# Patient Record
Sex: FEMALE | Race: WHITE | Employment: STUDENT | URBAN - METROPOLITAN AREA
[De-identification: names, ages, dates, MRNs, and addresses within clinical notes are randomized per-mention and may not be internally consistent; named-entity substitution may affect disease eponyms.]

---

## 2017-01-03 ENCOUNTER — OFFICE VISIT (OUTPATIENT)
Dept: FAMILY MEDICINE CLINIC | Facility: CLINIC | Age: 22
End: 2017-01-03

## 2017-01-03 VITALS
TEMPERATURE: 98 F | SYSTOLIC BLOOD PRESSURE: 110 MMHG | BODY MASS INDEX: 35.08 KG/M2 | RESPIRATION RATE: 20 BRPM | DIASTOLIC BLOOD PRESSURE: 72 MMHG | HEART RATE: 88 BPM | HEIGHT: 63 IN | WEIGHT: 198 LBS

## 2017-01-03 DIAGNOSIS — J01.90 ACUTE SINUSITIS, UNSPECIFIED: ICD-10-CM

## 2017-01-03 DIAGNOSIS — R10.9 ABDOMINAL DISCOMFORT: ICD-10-CM

## 2017-01-03 PROCEDURE — 99212 OFFICE O/P EST SF 10 MIN: CPT | Performed by: FAMILY MEDICINE

## 2017-01-03 PROCEDURE — 99213 OFFICE O/P EST LOW 20 MIN: CPT | Performed by: FAMILY MEDICINE

## 2017-01-03 RX ORDER — AMOXICILLIN 875 MG/1
875 TABLET, COATED ORAL 2 TIMES DAILY
Qty: 20 TABLET | Refills: 0 | Status: SHIPPED | OUTPATIENT
Start: 2017-01-03 | End: 2017-01-17

## 2017-01-03 NOTE — PROGRESS NOTES
HPI:    Patient ID: Patricia Booth is a 25year old female. HPI Comments: Pt presents for follow up from the immediate care for epigastric pains for a couple days. No fevers, diarrhea or vomiting. Patient was advised to take an antacid.  Patient states sym tenderness. There is no rebound and no guarding. Lymphadenopathy:     She has no cervical adenopathy.               ASSESSMENT/PLAN:   Abdominal discomfort:  - After discussion, will send to GI for further evaluation and treatment if recurrent or persiste

## 2017-01-16 ENCOUNTER — APPOINTMENT (OUTPATIENT)
Dept: LAB | Age: 22
End: 2017-01-16
Attending: OBSTETRICS & GYNECOLOGY
Payer: COMMERCIAL

## 2017-01-16 DIAGNOSIS — Z30.09 BIRTH CONTROL COUNSELING: ICD-10-CM

## 2017-01-16 LAB — HCG SERPL QL: NEGATIVE

## 2017-01-16 PROCEDURE — 84703 CHORIONIC GONADOTROPIN ASSAY: CPT

## 2017-01-16 PROCEDURE — 36415 COLL VENOUS BLD VENIPUNCTURE: CPT

## 2017-01-17 ENCOUNTER — OFFICE VISIT (OUTPATIENT)
Dept: OBGYN CLINIC | Facility: CLINIC | Age: 22
End: 2017-01-17

## 2017-01-17 VITALS
BODY MASS INDEX: 35 KG/M2 | WEIGHT: 196 LBS | SYSTOLIC BLOOD PRESSURE: 134 MMHG | DIASTOLIC BLOOD PRESSURE: 83 MMHG | HEART RATE: 96 BPM

## 2017-01-17 DIAGNOSIS — Z11.3 SCREEN FOR STD (SEXUALLY TRANSMITTED DISEASE): Primary | ICD-10-CM

## 2017-01-17 DIAGNOSIS — Z30.430 ENCOUNTER FOR INSERTION OF INTRAUTERINE CONTRACEPTIVE DEVICE: ICD-10-CM

## 2017-01-17 PROCEDURE — 58300 INSERT INTRAUTERINE DEVICE: CPT | Performed by: OBSTETRICS & GYNECOLOGY

## 2017-01-17 NOTE — PROCEDURES
IUD Insertion     Pregnancy Results: negative from blood test   Consent signed. Procedure discussed with the patient in detail including indication, risks, benefits, alternatives and complications.     Pelvic Exam Findings:  Lesion description: 8 week ante

## 2017-01-18 LAB
C TRACH DNA SPEC QL NAA+PROBE: NEGATIVE
N GONORRHOEA DNA SPEC QL NAA+PROBE: NEGATIVE

## 2017-03-03 ENCOUNTER — OFFICE VISIT (OUTPATIENT)
Dept: OBGYN CLINIC | Facility: CLINIC | Age: 22
End: 2017-03-03

## 2017-03-03 VITALS — SYSTOLIC BLOOD PRESSURE: 119 MMHG | HEART RATE: 90 BPM | DIASTOLIC BLOOD PRESSURE: 80 MMHG

## 2017-03-03 DIAGNOSIS — Z30.431 IUD CHECK UP: Primary | ICD-10-CM

## 2017-03-03 PROCEDURE — 99213 OFFICE O/P EST LOW 20 MIN: CPT | Performed by: OBSTETRICS & GYNECOLOGY

## 2017-03-05 NOTE — PROGRESS NOTES
Domenica Kaur is a 23year old female  Patient's last menstrual period was 2016. Patient presents with:  Gyn Problem: IUD check  Pt presents for IUD string check. She had Mirena IUD placed on 17. She denies any vaginal bleeding.  She is hap mobility, without tenderness  Adnexa: normal without masses or tenderness  Perineum: normal      Assessment & Plan:  Gregory Hastings was seen today for gyn problem. Diagnoses and all orders for this visit:    IUD check up      Plan:   1.  IUD in place by strings a

## 2017-04-13 ENCOUNTER — TELEPHONE (OUTPATIENT)
Dept: OBGYN CLINIC | Facility: CLINIC | Age: 22
End: 2017-04-13

## 2017-04-13 NOTE — TELEPHONE ENCOUNTER
This can be from the [address] IUD. It usually gets better with time. Recommend acne face washes and for her to see Dermatology if it is bothering her.

## 2017-04-13 NOTE — TELEPHONE ENCOUNTER
Had iud placed last month now has some acne out of no where not sure if from iud, no other symptoms, except some cramping in back area.

## 2017-04-13 NOTE — TELEPHONE ENCOUNTER
Pt calling because she is getting acne the last few days and wants to know if it is from the IUD. Denies any acne before the last few days. Had the IUD, Mirena, inserted 1/2017. Pt states that she does not get a period, but has cramping intermittently.

## 2017-06-13 ENCOUNTER — OFFICE VISIT (OUTPATIENT)
Dept: FAMILY MEDICINE CLINIC | Facility: CLINIC | Age: 22
End: 2017-06-13

## 2017-06-13 VITALS
SYSTOLIC BLOOD PRESSURE: 113 MMHG | DIASTOLIC BLOOD PRESSURE: 73 MMHG | BODY MASS INDEX: 35 KG/M2 | WEIGHT: 198 LBS | HEART RATE: 81 BPM

## 2017-06-13 DIAGNOSIS — M25.562 ACUTE PAIN OF LEFT KNEE: Primary | ICD-10-CM

## 2017-06-13 PROCEDURE — 99212 OFFICE O/P EST SF 10 MIN: CPT | Performed by: PHYSICIAN ASSISTANT

## 2017-06-13 PROCEDURE — 99213 OFFICE O/P EST LOW 20 MIN: CPT | Performed by: PHYSICIAN ASSISTANT

## 2017-06-13 NOTE — PROGRESS NOTES
HPI:    Patient ID: Gloria Calhoun is a 23year old female. HPI Comments: Patient presents for pain in left knee since yesterday. Patient states was walking at work and felt acute pain in left knee. She denies any twisting injury or fall.   She works in encounter.        Meds This Visit:  No prescriptions requested or ordered in this encounter    Imaging & Referrals:  None       #[id number]

## 2017-09-21 ENCOUNTER — NURSE TRIAGE (OUTPATIENT)
Dept: OTHER | Age: 22
End: 2017-09-21

## 2017-09-21 RX ORDER — AMOXICILLIN AND CLAVULANATE POTASSIUM 875; 125 MG/1; MG/1
1 TABLET, FILM COATED ORAL 2 TIMES DAILY
Qty: 20 TABLET | Refills: 0 | Status: SHIPPED | OUTPATIENT
Start: 2017-09-21 | End: 2018-01-03 | Stop reason: ALTCHOICE

## 2017-09-21 NOTE — TELEPHONE ENCOUNTER
Message noted. May start augmentin as requested. Erx sent to listed pharmacy.  To follow up for appointment if not better; Please notify patient

## 2017-09-21 NOTE — TELEPHONE ENCOUNTER
Action Requested: Summary for Provider     []  Critical Lab, Recommendations Needed  [] Need Additional Advice  []   FYI    []   Need Orders  [x] Need Medications Sent to Pharmacy  []  Other     SUMMARY: Pt requesting ABX-has chronic sinus infection last t

## 2018-01-03 ENCOUNTER — OFFICE VISIT (OUTPATIENT)
Dept: FAMILY MEDICINE CLINIC | Facility: CLINIC | Age: 23
End: 2018-01-03

## 2018-01-03 VITALS
TEMPERATURE: 100 F | SYSTOLIC BLOOD PRESSURE: 118 MMHG | DIASTOLIC BLOOD PRESSURE: 72 MMHG | RESPIRATION RATE: 16 BRPM | HEART RATE: 82 BPM | OXYGEN SATURATION: 98 %

## 2018-01-03 DIAGNOSIS — J06.9 VIRAL URI: Primary | ICD-10-CM

## 2018-01-03 PROCEDURE — 99213 OFFICE O/P EST LOW 20 MIN: CPT | Performed by: NURSE PRACTITIONER

## 2018-01-03 NOTE — PATIENT INSTRUCTIONS
Try afrin nasal spray for 3 days for nasal congestion. Viral Upper Respiratory Illness (Adult)  You have a viral upper respiratory illness (URI), which is another term for the common cold. This illness is contagious during the first few days.  It is sp When to seek medical advice  Call your healthcare provider right away if any of these occur:  · Cough with lots of colored sputum (mucus)  · Severe headache; face, neck, or ear pain  · Difficulty swallowing due to throat pain  · Fever of 100.4°F (38°C) or

## 2018-01-03 NOTE — PROGRESS NOTES
CHIEF COMPLAINT:   Patient presents with:  URI      HPI:   Veronica Perez is a 23year old female who presents for upper respiratory symptoms for 2-3 days. No fevers. Hx of sinus infections. OTC: sudafed. No current outpatient prescriptions on file.    No Viral uri  (primary encounter diagnosis)    PLAN: Comfort care as described in Patient Instructions. Work note provided. Patient Instructions     Try afrin nasal spray for 3 days for nasal congestion.     Viral Upper Respiratory Illness (Adult)  You ha · Over-the-counter cold medicines will not shorten the length of time you’re sick, but they may be helpful for the following symptoms: cough, sore throat, and nasal and sinus congestion.  (Note: Do not use decongestants if you have high blood pressure.)  Fo

## 2018-01-05 ENCOUNTER — TELEPHONE (OUTPATIENT)
Dept: FAMILY MEDICINE CLINIC | Facility: CLINIC | Age: 23
End: 2018-01-05

## 2018-01-05 ENCOUNTER — TELEPHONE (OUTPATIENT)
Dept: OTHER | Age: 23
End: 2018-01-05

## 2018-01-05 NOTE — TELEPHONE ENCOUNTER
Patient was seen in the UC on 1/3/17 and continues to have a sore throat. Her work is requiring her to have a return to work note. She tried to call the UC she went to and they are not answering her message. Appointment made for tomorrow 1/6/17.

## 2018-01-05 NOTE — TELEPHONE ENCOUNTER
Returned call from pt: states employer is requesting a specific date she can return to work. Called off work today due to sore throat and excessive coughing, and is not scheduled to work until 1/9/18. Note provided stating she can return to work then.  Disc

## 2018-02-05 ENCOUNTER — OFFICE VISIT (OUTPATIENT)
Dept: OBGYN CLINIC | Facility: CLINIC | Age: 23
End: 2018-02-05

## 2018-02-05 VITALS
HEIGHT: 63 IN | WEIGHT: 200 LBS | SYSTOLIC BLOOD PRESSURE: 114 MMHG | HEART RATE: 80 BPM | DIASTOLIC BLOOD PRESSURE: 73 MMHG | BODY MASS INDEX: 35.44 KG/M2

## 2018-02-05 DIAGNOSIS — Z01.419 WELL WOMAN EXAM WITH ROUTINE GYNECOLOGICAL EXAM: Primary | ICD-10-CM

## 2018-02-05 DIAGNOSIS — Z12.4 SCREENING FOR MALIGNANT NEOPLASM OF CERVIX: ICD-10-CM

## 2018-02-05 DIAGNOSIS — Z11.3 SCREENING FOR STD (SEXUALLY TRANSMITTED DISEASE): ICD-10-CM

## 2018-02-05 PROCEDURE — 99395 PREV VISIT EST AGE 18-39: CPT | Performed by: CLINICAL NURSE SPECIALIST

## 2018-02-05 RX ORDER — ACETAMINOPHEN AND CODEINE PHOSPHATE 300; 30 MG/1; MG/1
TABLET ORAL
COMMUNITY
Start: 2017-11-11 | End: 2018-05-08 | Stop reason: ALTCHOICE

## 2018-02-05 RX ORDER — CLINDAMYCIN HYDROCHLORIDE 150 MG/1
CAPSULE ORAL
COMMUNITY
Start: 2017-11-11 | End: 2018-05-08 | Stop reason: ALTCHOICE

## 2018-02-05 RX ORDER — IBUPROFEN 600 MG/1
TABLET ORAL
COMMUNITY
Start: 2017-11-14 | End: 2018-05-08 | Stop reason: ALTCHOICE

## 2018-02-05 NOTE — PROGRESS NOTES
Kimberly Montana is a 26year old female  No LMP recorded. Patient is not currently having periods (Reason: IUD - Intrauterine Device). Patient presents with:  Gyn Exam: annual exam, cramping and bleeding has IUD  Last annual exam was 2016.  No pap hx Age of Onset   • Diabetes Mother    • Heart Disorder Mother    • Cancer Other      lung cancer-grandfather       MEDICATIONS:    Current Outpatient Prescriptions:   •  Acetaminophen-Codeine #3 300-30 MG Oral Tab, , Disp: , Rfl:   •  Clindamycin HCl 150 MG without tenderness on motion.  IUD strings visualized  Uterus: normal in size, contour, position, mobility, without tenderness  Adnexa: normal without masses or tenderness  Perineum: normal  Anus: no hemorroids     Assessment & Plan:  Leland Hurtado was seen today f

## 2018-02-06 LAB
C TRACH DNA SPEC QL NAA+PROBE: NEGATIVE
N GONORRHOEA DNA SPEC QL NAA+PROBE: NEGATIVE

## 2018-02-07 LAB — T VAGINALIS RRNA SPEC QL NAA+PROBE: NEGATIVE

## 2018-05-08 ENCOUNTER — HOSPITAL ENCOUNTER (OUTPATIENT)
Age: 23
Discharge: HOME | End: 2018-05-08
Payer: COMMERCIAL

## 2018-05-08 VITALS
DIASTOLIC BLOOD PRESSURE: 74 MMHG | RESPIRATION RATE: 18 BRPM | WEIGHT: 198 LBS | TEMPERATURE: 99 F | SYSTOLIC BLOOD PRESSURE: 125 MMHG | BODY MASS INDEX: 35.08 KG/M2 | HEART RATE: 84 BPM | HEIGHT: 63 IN | OXYGEN SATURATION: 99 %

## 2018-05-08 DIAGNOSIS — J06.9 UPPER RESPIRATORY VIRUS: Primary | ICD-10-CM

## 2018-05-08 PROCEDURE — 99214 OFFICE O/P EST MOD 30 MIN: CPT

## 2018-05-08 PROCEDURE — 99213 OFFICE O/P EST LOW 20 MIN: CPT

## 2018-05-08 PROCEDURE — 87430 STREP A AG IA: CPT

## 2018-05-08 RX ORDER — ALBUTEROL SULFATE 90 UG/1
2 AEROSOL, METERED RESPIRATORY (INHALATION) EVERY 4 HOURS PRN
Qty: 1 INHALER | Refills: 0 | Status: SHIPPED | OUTPATIENT
Start: 2018-05-08 | End: 2018-06-07

## 2018-05-08 RX ORDER — BENZONATATE 100 MG/1
200 CAPSULE ORAL 3 TIMES DAILY PRN
Qty: 30 CAPSULE | Refills: 0 | Status: SHIPPED | OUTPATIENT
Start: 2018-05-08 | End: 2019-11-13

## 2018-05-09 NOTE — ED INITIAL ASSESSMENT (HPI)
Cough  Ear pain  Throat pain  Body aches  Nasal/sinus congestion and pressure  Symptoms started 2 days ago

## 2018-05-09 NOTE — ED PROVIDER NOTES
Patient presents with:  Cough/URI      HPI:     Erlinda Bucio is a 26year old female who presents for evaluation and management of a chief complaint of a productive cough with clear sputum, sore throat, bilateral ear fullness, and nasal congestion for the skin turgor, no obvious rashes  NECK: supple, no adenopathy. No meningismus.   CARDIO: RRR without murmur  EXTREMITIES: no cyanosis, edema, COUCH without difficulty  HEAD: normocephalic  EYES: sclera non icteric bilateral, conjunctiva clear  EARS: TM  bilater

## 2019-11-13 ENCOUNTER — OFFICE VISIT (OUTPATIENT)
Dept: FAMILY MEDICINE CLINIC | Facility: CLINIC | Age: 24
End: 2019-11-13
Payer: COMMERCIAL

## 2019-11-13 VITALS
WEIGHT: 188 LBS | BODY MASS INDEX: 33.31 KG/M2 | HEIGHT: 63 IN | SYSTOLIC BLOOD PRESSURE: 111 MMHG | DIASTOLIC BLOOD PRESSURE: 74 MMHG | HEART RATE: 80 BPM | TEMPERATURE: 99 F

## 2019-11-13 DIAGNOSIS — Z83.3 FH: DIABETES MELLITUS: ICD-10-CM

## 2019-11-13 DIAGNOSIS — E66.9 OBESITY (BMI 30-39.9): ICD-10-CM

## 2019-11-13 DIAGNOSIS — Z23 NEED FOR INFLUENZA VACCINATION: ICD-10-CM

## 2019-11-13 DIAGNOSIS — Z00.00 WELL ADULT EXAM: Primary | ICD-10-CM

## 2019-11-13 DIAGNOSIS — Z11.1 TUBERCULOSIS SCREENING: ICD-10-CM

## 2019-11-13 DIAGNOSIS — R63.2 BINGE EATING: ICD-10-CM

## 2019-11-13 PROCEDURE — 86580 TB INTRADERMAL TEST: CPT | Performed by: FAMILY MEDICINE

## 2019-11-13 PROCEDURE — 90686 IIV4 VACC NO PRSV 0.5 ML IM: CPT | Performed by: FAMILY MEDICINE

## 2019-11-13 PROCEDURE — 99395 PREV VISIT EST AGE 18-39: CPT | Performed by: FAMILY MEDICINE

## 2019-11-13 PROCEDURE — 90471 IMMUNIZATION ADMIN: CPT | Performed by: FAMILY MEDICINE

## 2019-11-13 NOTE — PROGRESS NOTES
HPI:    Patient ID: Laure Patrick is a 27year old female.     HPI  Patient presents with:  Routine Physical: would like a TB test currently in [place] for Phlebotomy       Here for physical  Not currently sexually active  Last pap last year, sees julito   [facility] Temp 98.5 °F (36.9 °C) (Oral)   Ht 5' 3\" (1.6 m)   Wt 188 lb (85.3 kg)   BMI 33.30 kg/m²     History reviewed. No pertinent past medical history. History reviewed. No pertinent surgical history.   Social History    Socioeconomic History      Marital sta Blood Transfusions: Not Asked        Caffeine Concern: Not Asked        Occupational Exposure: Not Asked        Hobby Hazards: Not Asked        Sleep Concern: Not Asked        Stress Concern: Not Asked        Weight Concern: Not Asked        Special Diet: normal.   Left Ear: External ear normal.   Nose: Nose normal.   Mouth/Throat: Oropharynx is clear and moist. No oropharyngeal exudate. Eyes: Pupils are equal, round, and reactive to light. Conjunctivae and EOM are normal. Right eye exhibits no discharge. minutes 5-6 days a week. Avoid skipping meals. Making healthy choices for snacks and also limiting sugary beverages. - BARIATRICS - INTERNAL  - HEMOGLOBIN A1C; Future    3. FH: diabetes mellitus    - HEMOGLOBIN A1C; Future    4.  Binge eating    - BA

## 2019-11-15 ENCOUNTER — APPOINTMENT (OUTPATIENT)
Dept: FAMILY MEDICINE CLINIC | Facility: CLINIC | Age: 24
End: 2019-11-15
Payer: COMMERCIAL

## 2019-11-15 DIAGNOSIS — Z11.1 ENCOUNTER FOR PPD SKIN TEST READING: Primary | ICD-10-CM

## 2019-11-27 ENCOUNTER — OFFICE VISIT (OUTPATIENT)
Dept: SURGERY | Facility: CLINIC | Age: 24
End: 2019-11-27
Payer: COMMERCIAL

## 2019-11-27 VITALS
SYSTOLIC BLOOD PRESSURE: 110 MMHG | DIASTOLIC BLOOD PRESSURE: 72 MMHG | WEIGHT: 185.38 LBS | HEART RATE: 60 BPM | BODY MASS INDEX: 31.65 KG/M2 | HEIGHT: 64 IN | OXYGEN SATURATION: 99 %

## 2019-11-27 DIAGNOSIS — R63.5 WEIGHT GAIN: Primary | ICD-10-CM

## 2019-11-27 DIAGNOSIS — R53.83 FATIGUE, UNSPECIFIED TYPE: ICD-10-CM

## 2019-11-27 DIAGNOSIS — E66.9 OBESITY (BMI 30-39.9): ICD-10-CM

## 2019-11-27 DIAGNOSIS — Z83.3 FH: DIABETES MELLITUS: ICD-10-CM

## 2019-11-27 DIAGNOSIS — R63.2 BINGE EATING: ICD-10-CM

## 2019-11-27 DIAGNOSIS — L68.0 HIRSUTISM: ICD-10-CM

## 2019-11-27 PROCEDURE — 99215 OFFICE O/P EST HI 40 MIN: CPT | Performed by: NURSE PRACTITIONER

## 2019-11-27 RX ORDER — BUPROPION HYDROCHLORIDE 150 MG/1
300 TABLET ORAL DAILY
Qty: 30 TABLET | Refills: 2 | Status: SHIPPED | OUTPATIENT
Start: 2019-11-27 | End: 2020-01-20

## 2019-11-27 NOTE — PROGRESS NOTES
The Wellness and Weight Loss Consultation Note       Date of Consult:  2019    Patient:  Eddi Caceres  :      1995  MRN:      [id number]    Referring Provider: Dr. Hue Alvarado       Chief Complaint:  Patient presents with:  Consult  Weight Manageme Take 2 tablets (300 mg total) by mouth daily. 30 tablet 2   • Levonorgestrel 20 MCG/24HR Intrauterine IUD 1 each by Intrauterine route once. Allergies:  Patient has no known allergies.      Comorbidities: None     Social History:  Social History  Service: Not Asked        Blood Transfusions: Not Asked        Caffeine Concern: Not Asked        Occupational Exposure: Not Asked        Hobby Hazards: Not Asked        Sleep Concern: Not Asked        Stress Concern: Not Asked        Weight Zuly daily food journal, Utilize portion control strategies to reduce calorie intake, Identify triggers for eating and manage cues and Eat slowly and take 20 to 30 minutes to complete each meal      ROS:  Constitutional: positive for fatigue  Respiratory: negat Future  -     buPROPion HCl ER, XL, 150 MG Oral Tablet 24 Hr; Take 2 tablets (300 mg total) by mouth daily.     Obesity (BMI 30-39.9)  -     DIETITIAN EDUCATION INITIAL, DIET (INTERNAL)  -     INSULIN; Future  -     C-REACTIVE PROTEIN; Future  -     buPROPi insulin     Discussed medication options for weight loss in detail with patient     Denies hx seizures  +stress eating, +PHQ score, BED 7+   IUD intact     Start medication wellbutrin  Discussed risks, benefits, rationale, and side effects of medication(s)

## 2019-12-12 ENCOUNTER — TELEPHONE (OUTPATIENT)
Dept: SURGERY | Facility: CLINIC | Age: 24
End: 2019-12-12

## 2019-12-12 NOTE — TELEPHONE ENCOUNTER
Per the request of Jennifer URIARTE, called patient and reviewed 2019 insurance benefits in regards to Dietitian and Body Composition Services. Patient was appreciative of the call but opted to hold off on scheduling an appointment at this time.  She will ca

## 2019-12-12 NOTE — TELEPHONE ENCOUNTER
Regulo@"[employer].Pontis Spoke to Jluis Cooley at [facility], #[phone], MICHELET#[id number].  They verified that patient has following benefits for below services:   [facility] Bariatric Dept (TRF#[id number])   Rendering Provider: Brady Arndt

## 2019-12-26 ENCOUNTER — OFFICE VISIT (OUTPATIENT)
Dept: SURGERY | Facility: CLINIC | Age: 24
End: 2019-12-26
Payer: COMMERCIAL

## 2019-12-26 VITALS
HEIGHT: 64 IN | OXYGEN SATURATION: 98 % | SYSTOLIC BLOOD PRESSURE: 116 MMHG | HEART RATE: 76 BPM | WEIGHT: 181.38 LBS | BODY MASS INDEX: 30.96 KG/M2 | DIASTOLIC BLOOD PRESSURE: 70 MMHG

## 2019-12-26 DIAGNOSIS — L68.0 HIRSUTISM: ICD-10-CM

## 2019-12-26 DIAGNOSIS — Z51.81 ENCOUNTER FOR THERAPEUTIC DRUG MONITORING: ICD-10-CM

## 2019-12-26 DIAGNOSIS — R63.2 BINGE EATING: Primary | ICD-10-CM

## 2019-12-26 DIAGNOSIS — R53.83 FATIGUE, UNSPECIFIED TYPE: ICD-10-CM

## 2019-12-26 DIAGNOSIS — Z83.3 FH: DIABETES MELLITUS: ICD-10-CM

## 2019-12-26 DIAGNOSIS — E66.9 OBESITY (BMI 30-39.9): ICD-10-CM

## 2019-12-26 PROCEDURE — 99214 OFFICE O/P EST MOD 30 MIN: CPT | Performed by: NURSE PRACTITIONER

## 2019-12-26 NOTE — PATIENT INSTRUCTIONS
Ultimate FIX, Fixate Cookbook    Values: Disease prevention, self, self-worth    Visualize becoming more healthy every day  On a post it write: I love AND accept myself unconditionally right now.  Repeat this to yourself twice a day  Be gentle with yourse your appetite. 9. Eat at least 4 servings of vegetables and 2 servings for fruits each day. 10. Add fiber to slow down digestion and keep you full longer. 11. Cut out sugar sweetened beverages. 12. Avoid highly processed carbohydrates.   13. Use the

## 2019-12-26 NOTE — PROGRESS NOTES
[address], [address], [address], [address] [place] [address]  Dept: [phone]       Patient:  Sunil Tavarez  :      1995  MRN:      [id number]    Chief Complaint:  Patient present kg/m²     Initial weight loss: -04   Total weight loss: -04    Start weight: 185    Wt Readings from Last 6 Encounters:  12/26/19 : 181 lb 6.4 oz (82.3 kg)  11/27/19 : 185 lb 6.4 oz (84.1 kg)  11/13/19 : 188 lb (85.3 kg)  05/08/18 : 198 lb (89.8 kg)  02/05 Yazidism service: Not on file        Active member of club or organization: Not on file        Attends meetings of clubs or organizations: Not on file        Relationship status: Not on file      Intimate partner violence:        Fear of current or ex par work outs strength    [address]  · Patient states the following:  · Eats 2-3 meal(s) per day  · Length of time it takes to consume a meal:  20  · # of snacks per day: 1 Type of snacks:  Fruit, cheese  · Amount of soda consumption per day:  None  · Amou murmur, click, rub or gallop, regular rate and rhythm  Abdomen: soft, non-tender; bowel sounds normal; no masses,  no organomegaly  Extremities: extremities normal, atraumatic, no cyanosis or edema  Pulses: 2+ and symmetric  Skin: Skin color, texture, turg moderate exercise per week. 4. Increase fruit and vegetable servings to 5-6 per day. 5. Improve sleep and stress.    6. Practice self love/compassion.     Update labs as ordered by pcp, include crp and insulin      Discussed medication options for jacey

## 2020-01-19 DIAGNOSIS — R63.5 WEIGHT GAIN: ICD-10-CM

## 2020-01-19 DIAGNOSIS — R53.83 FATIGUE, UNSPECIFIED TYPE: ICD-10-CM

## 2020-01-19 DIAGNOSIS — L68.0 HIRSUTISM: ICD-10-CM

## 2020-01-19 DIAGNOSIS — Z83.3 FH: DIABETES MELLITUS: ICD-10-CM

## 2020-01-19 DIAGNOSIS — E66.9 OBESITY (BMI 30-39.9): ICD-10-CM

## 2020-01-19 RX ORDER — BUPROPION HYDROCHLORIDE 150 MG/1
300 TABLET ORAL DAILY
Qty: 30 TABLET | Refills: 2 | Status: CANCELLED | OUTPATIENT
Start: 2020-01-19

## 2020-01-20 DIAGNOSIS — Z83.3 FH: DIABETES MELLITUS: ICD-10-CM

## 2020-01-20 DIAGNOSIS — L68.0 HIRSUTISM: ICD-10-CM

## 2020-01-20 DIAGNOSIS — R53.83 FATIGUE, UNSPECIFIED TYPE: ICD-10-CM

## 2020-01-20 DIAGNOSIS — R63.5 WEIGHT GAIN: ICD-10-CM

## 2020-01-20 DIAGNOSIS — E66.9 OBESITY (BMI 30-39.9): ICD-10-CM

## 2020-01-20 RX ORDER — BUPROPION HYDROCHLORIDE 150 MG/1
300 TABLET ORAL DAILY
Qty: 60 TABLET | Refills: 0 | Status: SHIPPED | OUTPATIENT
Start: 2020-01-20 | End: 2020-02-24

## 2020-01-23 ENCOUNTER — LAB ENCOUNTER (OUTPATIENT)
Dept: LAB | Age: 25
End: 2020-01-23
Attending: FAMILY MEDICINE
Payer: COMMERCIAL

## 2020-01-23 DIAGNOSIS — R53.83 FATIGUE, UNSPECIFIED TYPE: ICD-10-CM

## 2020-01-23 DIAGNOSIS — E66.9 OBESITY (BMI 30-39.9): ICD-10-CM

## 2020-01-23 DIAGNOSIS — Z83.3 FH: DIABETES MELLITUS: ICD-10-CM

## 2020-01-23 DIAGNOSIS — R63.5 WEIGHT GAIN: ICD-10-CM

## 2020-01-23 DIAGNOSIS — L68.0 HIRSUTISM: ICD-10-CM

## 2020-01-23 DIAGNOSIS — Z00.00 WELL ADULT EXAM: ICD-10-CM

## 2020-01-23 LAB
ALT SERPL-CCNC: 19 U/L (ref 13–56)
ANION GAP SERPL CALC-SCNC: 5 MMOL/L (ref 0–18)
AST SERPL-CCNC: 13 U/L (ref 15–37)
BASOPHILS # BLD AUTO: 0.03 X10(3) UL (ref 0–0.2)
BASOPHILS NFR BLD AUTO: 0.5 %
BUN BLD-MCNC: 8 MG/DL (ref 7–18)
BUN/CREAT SERPL: 10.8 (ref 10–20)
CALCIUM BLD-MCNC: 9.1 MG/DL (ref 8.5–10.1)
CHLORIDE SERPL-SCNC: 108 MMOL/L (ref 98–112)
CHOLEST SMN-MCNC: 145 MG/DL (ref ?–200)
CO2 SERPL-SCNC: 27 MMOL/L (ref 21–32)
CREAT BLD-MCNC: 0.74 MG/DL (ref 0.55–1.02)
CRP SERPL-MCNC: 0.86 MG/DL (ref ?–0.3)
DEPRECATED RDW RBC AUTO: 40.4 FL (ref 35.1–46.3)
EOSINOPHIL # BLD AUTO: 0.1 X10(3) UL (ref 0–0.7)
EOSINOPHIL NFR BLD AUTO: 1.6 %
ERYTHROCYTE [DISTWIDTH] IN BLOOD BY AUTOMATED COUNT: 12.8 % (ref 11–15)
EST. AVERAGE GLUCOSE BLD GHB EST-MCNC: 103 MG/DL (ref 68–126)
GLUCOSE BLD-MCNC: 78 MG/DL (ref 70–99)
HBA1C MFR BLD HPLC: 5.2 % (ref ?–5.7)
HCT VFR BLD AUTO: 41.5 % (ref 35–48)
HDLC SERPL-MCNC: 36 MG/DL (ref 40–59)
HGB BLD-MCNC: 13.8 G/DL (ref 12–16)
IMM GRANULOCYTES # BLD AUTO: 0.01 X10(3) UL (ref 0–1)
IMM GRANULOCYTES NFR BLD: 0.2 %
INSULIN SERPL-ACNC: 8.1 MU/L (ref 3–25)
LDLC SERPL CALC-MCNC: 99 MG/DL (ref ?–100)
LYMPHOCYTES # BLD AUTO: 1.24 X10(3) UL (ref 1–4)
LYMPHOCYTES NFR BLD AUTO: 19.7 %
MCH RBC QN AUTO: 28.9 PG (ref 26–34)
MCHC RBC AUTO-ENTMCNC: 33.3 G/DL (ref 31–37)
MCV RBC AUTO: 87 FL (ref 80–100)
MONOCYTES # BLD AUTO: 0.47 X10(3) UL (ref 0.1–1)
MONOCYTES NFR BLD AUTO: 7.4 %
NEUTROPHILS # BLD AUTO: 4.46 X10 (3) UL (ref 1.5–7.7)
NEUTROPHILS # BLD AUTO: 4.46 X10(3) UL (ref 1.5–7.7)
NEUTROPHILS NFR BLD AUTO: 70.6 %
NONHDLC SERPL-MCNC: 109 MG/DL (ref ?–130)
OSMOLALITY SERPL CALC.SUM OF ELEC: 287 MOSM/KG (ref 275–295)
PATIENT FASTING Y/N/NP: YES
PATIENT FASTING Y/N/NP: YES
PLATELET # BLD AUTO: 329 10(3)UL (ref 150–450)
POTASSIUM SERPL-SCNC: 4 MMOL/L (ref 3.5–5.1)
RBC # BLD AUTO: 4.77 X10(6)UL (ref 3.8–5.3)
SODIUM SERPL-SCNC: 140 MMOL/L (ref 136–145)
TRIGL SERPL-MCNC: 48 MG/DL (ref 30–149)
TSI SER-ACNC: 1.12 MIU/ML (ref 0.36–3.74)
VIT B12 SERPL-MCNC: 316 PG/ML (ref 193–986)
VLDLC SERPL CALC-MCNC: 10 MG/DL (ref 0–30)
WBC # BLD AUTO: 6.3 X10(3) UL (ref 4–11)

## 2020-01-23 PROCEDURE — 82607 VITAMIN B-12: CPT

## 2020-01-23 PROCEDURE — 86140 C-REACTIVE PROTEIN: CPT

## 2020-01-23 PROCEDURE — 83525 ASSAY OF INSULIN: CPT

## 2020-01-23 PROCEDURE — 84460 ALANINE AMINO (ALT) (SGPT): CPT

## 2020-01-23 PROCEDURE — 84443 ASSAY THYROID STIM HORMONE: CPT

## 2020-01-23 PROCEDURE — 82306 VITAMIN D 25 HYDROXY: CPT

## 2020-01-23 PROCEDURE — 83036 HEMOGLOBIN GLYCOSYLATED A1C: CPT

## 2020-01-23 PROCEDURE — 80061 LIPID PANEL: CPT

## 2020-01-23 PROCEDURE — 85025 COMPLETE CBC W/AUTO DIFF WBC: CPT

## 2020-01-23 PROCEDURE — 80048 BASIC METABOLIC PNL TOTAL CA: CPT

## 2020-01-23 PROCEDURE — 36415 COLL VENOUS BLD VENIPUNCTURE: CPT

## 2020-01-23 PROCEDURE — 84450 TRANSFERASE (AST) (SGOT): CPT

## 2020-01-24 LAB — 25(OH)D3 SERPL-MCNC: 13 NG/ML (ref 30–100)

## 2020-01-25 ENCOUNTER — OFFICE VISIT (OUTPATIENT)
Dept: OTOLARYNGOLOGY | Facility: CLINIC | Age: 25
End: 2020-01-25
Payer: COMMERCIAL

## 2020-01-25 ENCOUNTER — OFFICE VISIT (OUTPATIENT)
Dept: AUDIOLOGY | Facility: CLINIC | Age: 25
End: 2020-01-25
Payer: COMMERCIAL

## 2020-01-25 VITALS
TEMPERATURE: 98 F | WEIGHT: 181 LBS | SYSTOLIC BLOOD PRESSURE: 132 MMHG | DIASTOLIC BLOOD PRESSURE: 82 MMHG | HEIGHT: 66 IN | BODY MASS INDEX: 29.09 KG/M2

## 2020-01-25 DIAGNOSIS — R42 DIZZINESS: ICD-10-CM

## 2020-01-25 DIAGNOSIS — H93.12 TINNITUS, LEFT: Primary | ICD-10-CM

## 2020-01-25 DIAGNOSIS — H93.19 RINGING IN EAR, UNSPECIFIED LATERALITY: Primary | ICD-10-CM

## 2020-01-25 PROCEDURE — 92557 COMPREHENSIVE HEARING TEST: CPT | Performed by: AUDIOLOGIST

## 2020-01-25 PROCEDURE — 92567 TYMPANOMETRY: CPT | Performed by: AUDIOLOGIST

## 2020-01-25 PROCEDURE — 99203 OFFICE O/P NEW LOW 30 MIN: CPT | Performed by: OTOLARYNGOLOGY

## 2020-01-25 PROCEDURE — 99212 OFFICE O/P EST SF 10 MIN: CPT | Performed by: OTOLARYNGOLOGY

## 2020-01-25 RX ORDER — MECLIZINE HYDROCHLORIDE 25 MG/1
25 TABLET ORAL 3 TIMES DAILY PRN
Qty: 30 TABLET | Refills: 0 | Status: SHIPPED | OUTPATIENT
Start: 2020-01-25 | End: 2020-09-17

## 2020-01-25 RX ORDER — PREDNISONE 10 MG/1
TABLET ORAL
Qty: 37 TABLET | Refills: 0 | Status: SHIPPED | OUTPATIENT
Start: 2020-01-25 | End: 2020-02-27 | Stop reason: ALTCHOICE

## 2020-01-25 NOTE — PROGRESS NOTES
Carl Mobley is a 23year old female. Patient presents with:  Ringing In Ear: ringing in the left ear for 3 days  Dizziness: c/o dizziness for 2 weeks    HPI:   She is been having intermittent dizziness over the last 2 weeks.   She did have a cold recently otherwise  GENERAL : denies fever, chills, sweats, weight loss, weight gain  SKIN: denies any unusual skin lesions or rashes  RESPIRATORY: denies shortness of breath with exertion  NEURO: denies headaches    EXAM:   /82   Temp 97.8 °F (36.6 °C) (Tymp precautions were discussed with her  - OP REFERRAL TO AUDIOLOGY    2. Dizziness          The patient indicates understanding of these issues and agrees to the plan. Holden Gates MD  1/25/2020  12:12 PM

## 2020-01-25 NOTE — PROGRESS NOTES
AUDIOGRAM     Agustin Vallejo was referred for testing by No ref. provider found. 1/7/1995  [id number]      Otoscopic Inspection:  both ears: no cerumen    Audiometric Test Results: The patient was tested using air and bone audiometry.   The audiometric thr

## 2020-01-30 ENCOUNTER — OFFICE VISIT (OUTPATIENT)
Dept: SURGERY | Facility: CLINIC | Age: 25
End: 2020-01-30
Payer: COMMERCIAL

## 2020-01-30 VITALS
DIASTOLIC BLOOD PRESSURE: 60 MMHG | HEIGHT: 64 IN | BODY MASS INDEX: 30.35 KG/M2 | HEART RATE: 90 BPM | SYSTOLIC BLOOD PRESSURE: 100 MMHG | WEIGHT: 177.81 LBS | OXYGEN SATURATION: 98 %

## 2020-01-30 DIAGNOSIS — E66.9 OBESITY (BMI 30-39.9): ICD-10-CM

## 2020-01-30 DIAGNOSIS — E55.9 VITAMIN D DEFICIENCY: ICD-10-CM

## 2020-01-30 DIAGNOSIS — Z83.3 FH: DIABETES MELLITUS: ICD-10-CM

## 2020-01-30 DIAGNOSIS — R63.2 BINGE EATING: Primary | ICD-10-CM

## 2020-01-30 DIAGNOSIS — Z51.81 ENCOUNTER FOR THERAPEUTIC DRUG MONITORING: ICD-10-CM

## 2020-01-30 DIAGNOSIS — L68.0 HIRSUTISM: ICD-10-CM

## 2020-01-30 DIAGNOSIS — R53.83 FATIGUE, UNSPECIFIED TYPE: ICD-10-CM

## 2020-01-30 PROCEDURE — 99214 OFFICE O/P EST MOD 30 MIN: CPT | Performed by: NURSE PRACTITIONER

## 2020-01-30 NOTE — PATIENT INSTRUCTIONS
Fiber grams per day should be 35-45 grams. Ultimate FIX, Fixate Cookbook    Values: Disease prevention, self, self-worth    Visualize becoming more healthy every day  On a post it write: I love AND accept myself unconditionally right now.  Repeat th sweetened beverages. 12. Avoid highly processed carbohydrates. 13. Use the healthy plate method as a reference: Lunch & Dinner plate: 1/2 vegetables (and some fruit), 1/4 protein, 1/4 healthy starch (may decrease this portion).   14. Aim to lose 1 to 2 l

## 2020-02-24 DIAGNOSIS — L68.0 HIRSUTISM: ICD-10-CM

## 2020-02-24 DIAGNOSIS — Z83.3 FH: DIABETES MELLITUS: ICD-10-CM

## 2020-02-24 DIAGNOSIS — E66.9 OBESITY (BMI 30-39.9): ICD-10-CM

## 2020-02-24 DIAGNOSIS — R53.83 FATIGUE, UNSPECIFIED TYPE: ICD-10-CM

## 2020-02-24 DIAGNOSIS — R63.5 WEIGHT GAIN: ICD-10-CM

## 2020-02-24 RX ORDER — BUPROPION HYDROCHLORIDE 150 MG/1
300 TABLET ORAL DAILY
Qty: 60 TABLET | Refills: 0 | Status: SHIPPED | OUTPATIENT
Start: 2020-02-24 | End: 2020-02-27

## 2020-02-27 ENCOUNTER — OFFICE VISIT (OUTPATIENT)
Dept: SURGERY | Facility: CLINIC | Age: 25
End: 2020-02-27
Payer: COMMERCIAL

## 2020-02-27 VITALS
OXYGEN SATURATION: 98 % | HEART RATE: 55 BPM | HEIGHT: 64 IN | DIASTOLIC BLOOD PRESSURE: 58 MMHG | BODY MASS INDEX: 30.08 KG/M2 | WEIGHT: 176.19 LBS | SYSTOLIC BLOOD PRESSURE: 100 MMHG

## 2020-02-27 DIAGNOSIS — L68.0 HIRSUTISM: ICD-10-CM

## 2020-02-27 DIAGNOSIS — E55.9 VITAMIN D DEFICIENCY: ICD-10-CM

## 2020-02-27 DIAGNOSIS — Z83.3 FH: DIABETES MELLITUS: ICD-10-CM

## 2020-02-27 DIAGNOSIS — E66.9 OBESITY (BMI 30-39.9): ICD-10-CM

## 2020-02-27 DIAGNOSIS — R63.5 WEIGHT GAIN: ICD-10-CM

## 2020-02-27 DIAGNOSIS — R63.2 BINGE EATING: ICD-10-CM

## 2020-02-27 DIAGNOSIS — R53.83 FATIGUE, UNSPECIFIED TYPE: ICD-10-CM

## 2020-02-27 DIAGNOSIS — Z51.81 ENCOUNTER FOR THERAPEUTIC DRUG MONITORING: Primary | ICD-10-CM

## 2020-02-27 PROCEDURE — 99214 OFFICE O/P EST MOD 30 MIN: CPT | Performed by: NURSE PRACTITIONER

## 2020-02-27 RX ORDER — BUPROPION HYDROCHLORIDE 150 MG/1
300 TABLET ORAL DAILY
Qty: 60 TABLET | Refills: 2 | Status: SHIPPED | OUTPATIENT
Start: 2020-02-27 | End: 2020-03-25

## 2020-02-27 NOTE — PROGRESS NOTES
[address], [address], [address], [address] [place] [address]  Dept: [phone]       Patient:  Erlinda Bucio  :      1995  MRN:      [id number]    Chief Complaint:  Patient present Encounters:  02/27/20 : 176 lb 3.2 oz (79.9 kg)  01/30/20 : 177 lb 12.8 oz (80.6 kg)  01/25/20 : 181 lb (82.1 kg)  01/10/20 : 181 lb (82.1 kg)  12/26/19 : 181 lb 6.4 oz (82.3 kg)  11/27/19 : 185 lb 6.4 oz (84.1 kg)      Patient Medications:    Current Outp Drug use: Yes        Frequency: 1.0 times per week        Types: Cannabis        Comment: once a week      Sexual activity: Yes        Partners: Male        Birth control/protection: Mirena        Comment: same partner since October Lifestyle      Physi Drinker: Yes                 If yes, how much?:  Diet pop- 1 can/day  Sports Drinks:   Yes               If yes, how much?:  Powerade, one every 3 weeks   Juice:  No                        Food Journal  · Reviewed and Discussed:       · Patient has a Food J negative  Genitourinary:negative, IUD intact   Integument/breast: positive for mild facial hair growth   Hematologic/lymphatic: negative  Musculoskeletal:negative  Neurological: negative  Behavioral/Psych: positive for depression and situational   Endocrin buPROPion HCl ER, XL, 150 MG Oral Tablet 24 Hr; Take 2 tablets (300 mg total) by mouth daily. Vitamin D deficiency    Weight gain  -     buPROPion HCl ER, XL, 150 MG Oral Tablet 24 Hr; Take 2 tablets (300 mg total) by mouth daily.       OBESITY/WEIGHT GA    Aim for adequate fiber intake - 35-45 g/day     Continue IF, 12 hours     Meet with Alexandrea STEWART for nutrition counseling      Continue MVI, hold off on fish oil      RTC 6 weeks      MATTHIEU Mcginnis

## 2020-02-27 NOTE — PATIENT INSTRUCTIONS
Aim for 40 grams of fiber a day-   1 cup of cooked lentils is 15 grams of fiber   2 tbsp of flaxseed ground is 4 grams of fiber  1/2 cup cooked oatmeal is 4 grams of fiber; add in 1/2 cup raspberries for an additional 4 grams of fiber     Bring in vege

## 2020-03-25 DIAGNOSIS — Z83.3 FH: DIABETES MELLITUS: ICD-10-CM

## 2020-03-25 DIAGNOSIS — R53.83 FATIGUE, UNSPECIFIED TYPE: ICD-10-CM

## 2020-03-25 DIAGNOSIS — E66.9 OBESITY (BMI 30-39.9): ICD-10-CM

## 2020-03-25 DIAGNOSIS — E55.9 VITAMIN D DEFICIENCY: ICD-10-CM

## 2020-03-25 DIAGNOSIS — L68.0 HIRSUTISM: ICD-10-CM

## 2020-03-25 DIAGNOSIS — R63.5 WEIGHT GAIN: ICD-10-CM

## 2020-03-25 RX ORDER — BUPROPION HYDROCHLORIDE 150 MG/1
300 TABLET ORAL DAILY
Qty: 60 TABLET | Refills: 2 | Status: SHIPPED | OUTPATIENT
Start: 2020-03-25 | End: 2020-09-17

## 2020-07-27 ENCOUNTER — TELEPHONE (OUTPATIENT)
Dept: FAMILY MEDICINE CLINIC | Facility: CLINIC | Age: 25
End: 2020-07-27

## 2020-07-27 NOTE — TELEPHONE ENCOUNTER
Call transferred by CSS: Pt requesting COVID-19 test. Denies symptoms or direct contact. States had contact with someone who may have had direct contact with someone with COVID-19.  Informed would not qualify for testing based on this though [facility] at this [facility]

## 2020-09-03 DIAGNOSIS — E55.9 VITAMIN D DEFICIENCY: ICD-10-CM

## 2020-09-03 DIAGNOSIS — R53.83 FATIGUE, UNSPECIFIED TYPE: ICD-10-CM

## 2020-09-03 DIAGNOSIS — L68.0 HIRSUTISM: ICD-10-CM

## 2020-09-03 DIAGNOSIS — R63.5 WEIGHT GAIN: ICD-10-CM

## 2020-09-03 DIAGNOSIS — Z83.3 FH: DIABETES MELLITUS: ICD-10-CM

## 2020-09-03 DIAGNOSIS — E66.9 OBESITY (BMI 30-39.9): ICD-10-CM

## 2020-09-04 RX ORDER — BUPROPION HYDROCHLORIDE 150 MG/1
300 TABLET ORAL DAILY
Qty: 60 TABLET | Refills: 2 | OUTPATIENT
Start: 2020-09-04

## 2020-09-17 ENCOUNTER — OFFICE VISIT (OUTPATIENT)
Dept: SURGERY | Facility: CLINIC | Age: 25
End: 2020-09-17
Payer: COMMERCIAL

## 2020-09-17 VITALS
WEIGHT: 159.63 LBS | BODY MASS INDEX: 27.25 KG/M2 | HEART RATE: 94 BPM | HEIGHT: 64 IN | OXYGEN SATURATION: 98 % | SYSTOLIC BLOOD PRESSURE: 124 MMHG | DIASTOLIC BLOOD PRESSURE: 79 MMHG

## 2020-09-17 DIAGNOSIS — Z51.81 ENCOUNTER FOR THERAPEUTIC DRUG MONITORING: ICD-10-CM

## 2020-09-17 DIAGNOSIS — R53.83 FATIGUE, UNSPECIFIED TYPE: ICD-10-CM

## 2020-09-17 DIAGNOSIS — Z86.39 HX OF OBESITY: Primary | ICD-10-CM

## 2020-09-17 DIAGNOSIS — R63.2 BINGE EATING: ICD-10-CM

## 2020-09-17 DIAGNOSIS — L68.0 HIRSUTISM: ICD-10-CM

## 2020-09-17 DIAGNOSIS — E66.3 PATIENT OVERWEIGHT: ICD-10-CM

## 2020-09-17 DIAGNOSIS — Z83.3 FH: DIABETES MELLITUS: ICD-10-CM

## 2020-09-17 PROCEDURE — 3008F BODY MASS INDEX DOCD: CPT | Performed by: NURSE PRACTITIONER

## 2020-09-17 PROCEDURE — 3078F DIAST BP <80 MM HG: CPT | Performed by: NURSE PRACTITIONER

## 2020-09-17 PROCEDURE — 99213 OFFICE O/P EST LOW 20 MIN: CPT | Performed by: NURSE PRACTITIONER

## 2020-09-17 PROCEDURE — 3074F SYST BP LT 130 MM HG: CPT | Performed by: NURSE PRACTITIONER

## 2020-09-17 NOTE — PATIENT INSTRUCTIONS
Eat foods high in tryptophan and healthy omega 3 fats (these naturally elevate the happiness hormones and mood):     Walnuts, almonds, flaxseeds, deysi seeds, sesame seeds, pumpkin seeds, tofu, brussel sprouts, cauliflower, broccoli, winter squash    Com

## 2020-09-17 NOTE — PROGRESS NOTES
[address], [address], [address], [address] [place] [address]  Dept: [phone]       Patient:  John Valenzuela  :      1995  MRN:      [id number]    Chief Complaint:  Patient present Encounters:  09/17/20 : 159 lb 9.6 oz (72.4 kg)  02/27/20 : 176 lb 3.2 oz (79.9 kg)  01/30/20 : 177 lb 12.8 oz (80.6 kg)  01/25/20 : 181 lb (82.1 kg)  01/10/20 : 181 lb (82.1 kg)  12/26/19 : 181 lb 6.4 oz (82.3 kg)      Patient Medications:    Current Outp service: Not on file        Active member of club or organization: Not on file        Attends meetings of clubs or organizations: Not on file        Relationship status: Not on file      Intimate partner violence:        Fear of current or ex partner: Not days/week    Eating Habits  · Patient states the following:  · Eats 2-3 meal(s) per day  · # of snacks per day: 1 Type of snacks:  deysi seed packs, fruit  · Amount of soda consumption per day:  Occasional   · Amount of water (in ounces) per day:  Aims for non-tender; bowel sounds normal; no masses,  no organomegaly  Extremities: extremities normal, atraumatic, no cyanosis or edema  Pulses: 2+ and symmetric  Skin: Skin color, texture, turgor normal. No rashes or lesions or mild excoriation back; mild erythem vegetable servings to 5-6 per day. 5. Improve sleep and stress. 6. Practice self love/compassion.     Labs updated by pcp: 1/23/2020  CRP elevated and insulin in range. Vitamin D low, supplementing 2000 IU/day. No other significant abnormalities.

## 2021-03-01 ENCOUNTER — OFFICE VISIT (OUTPATIENT)
Dept: OBGYN CLINIC | Facility: CLINIC | Age: 26
End: 2021-03-01
Payer: COMMERCIAL

## 2021-03-01 VITALS
SYSTOLIC BLOOD PRESSURE: 130 MMHG | WEIGHT: 163 LBS | DIASTOLIC BLOOD PRESSURE: 74 MMHG | BODY MASS INDEX: 28.53 KG/M2 | HEART RATE: 76 BPM | HEIGHT: 63.4 IN

## 2021-03-01 DIAGNOSIS — Z01.419 WELL WOMAN EXAM WITH ROUTINE GYNECOLOGICAL EXAM: Primary | ICD-10-CM

## 2021-03-01 DIAGNOSIS — Z30.431 IUD CHECK UP: ICD-10-CM

## 2021-03-01 DIAGNOSIS — Z11.3 SCREENING FOR STD (SEXUALLY TRANSMITTED DISEASE): ICD-10-CM

## 2021-03-01 DIAGNOSIS — Z12.4 CERVICAL CANCER SCREENING: ICD-10-CM

## 2021-03-01 PROCEDURE — 3008F BODY MASS INDEX DOCD: CPT | Performed by: CLINICAL NURSE SPECIALIST

## 2021-03-01 PROCEDURE — 3078F DIAST BP <80 MM HG: CPT | Performed by: CLINICAL NURSE SPECIALIST

## 2021-03-01 PROCEDURE — 99385 PREV VISIT NEW AGE 18-39: CPT | Performed by: CLINICAL NURSE SPECIALIST

## 2021-03-01 PROCEDURE — 3075F SYST BP GE 130 - 139MM HG: CPT | Performed by: CLINICAL NURSE SPECIALIST

## 2021-03-02 LAB
C TRACH DNA SPEC QL NAA+PROBE: NEGATIVE
N GONORRHOEA DNA SPEC QL NAA+PROBE: NEGATIVE

## 2021-03-03 LAB — T VAGINALIS RRNA SPEC QL NAA+PROBE: NEGATIVE

## 2021-03-04 LAB — LAST PAP RESULT: NORMAL

## 2021-09-13 ENCOUNTER — OFFICE VISIT (OUTPATIENT)
Dept: SURGERY | Facility: CLINIC | Age: 26
End: 2021-09-13
Payer: COMMERCIAL

## 2021-09-13 VITALS
WEIGHT: 173.81 LBS | SYSTOLIC BLOOD PRESSURE: 120 MMHG | OXYGEN SATURATION: 100 % | BODY MASS INDEX: 29.67 KG/M2 | HEART RATE: 77 BPM | DIASTOLIC BLOOD PRESSURE: 72 MMHG | HEIGHT: 64 IN

## 2021-09-13 DIAGNOSIS — Z83.3 FH: DIABETES MELLITUS: ICD-10-CM

## 2021-09-13 DIAGNOSIS — Z86.39 HX OF OBESITY: ICD-10-CM

## 2021-09-13 DIAGNOSIS — E53.8 LOW VITAMIN B12 LEVEL: ICD-10-CM

## 2021-09-13 DIAGNOSIS — L68.0 HIRSUTISM: ICD-10-CM

## 2021-09-13 DIAGNOSIS — E66.3 PATIENT OVERWEIGHT: ICD-10-CM

## 2021-09-13 DIAGNOSIS — Z51.81 ENCOUNTER FOR THERAPEUTIC DRUG MONITORING: Primary | ICD-10-CM

## 2021-09-13 DIAGNOSIS — R63.2 BINGE EATING: ICD-10-CM

## 2021-09-13 DIAGNOSIS — R53.83 FATIGUE, UNSPECIFIED TYPE: ICD-10-CM

## 2021-09-13 DIAGNOSIS — E55.9 VITAMIN D DEFICIENCY: ICD-10-CM

## 2021-09-13 PROBLEM — R79.89 LOW VITAMIN B12 LEVEL: Status: ACTIVE | Noted: 2021-09-13

## 2021-09-13 PROCEDURE — 3078F DIAST BP <80 MM HG: CPT | Performed by: NURSE PRACTITIONER

## 2021-09-13 PROCEDURE — 3008F BODY MASS INDEX DOCD: CPT | Performed by: NURSE PRACTITIONER

## 2021-09-13 PROCEDURE — 3074F SYST BP LT 130 MM HG: CPT | Performed by: NURSE PRACTITIONER

## 2021-09-13 PROCEDURE — 99214 OFFICE O/P EST MOD 30 MIN: CPT | Performed by: NURSE PRACTITIONER

## 2021-09-13 NOTE — PROGRESS NOTES
[address], [address], [address], [address] [place] [address]  Dept: [phone]       Patient:  Delmi Suero  :      1995  MRN:      [id number]    Chief Complaint:  Patient present weight: 185    Wt Readings from Last 6 Encounters:  09/13/21 : 173 lb 12.8 oz (78.8 kg)  03/09/21 : 165 lb (74.8 kg)  03/01/21 : 163 lb (73.9 kg)  09/17/20 : 159 lb 9.6 oz (72.4 kg)  02/27/20 : 176 lb 3.2 oz (79.9 kg)  01/30/20 : 177 lb 12.8 oz (80.6 kg) Asked        Seat Belt: Not Asked        Self-Exams: Not Asked    Social History Narrative      Not on file    Social Determinants of Health  Financial Resource Strain:       Difficulty of Paying Living Expenses: Not on file  Food Insecurity:       Worried can 2-3 per week   Soda Drinker: Yes                 If yes, how much?:  Diet pop- 1 can/day  Sports Drinks:   Yes               If yes, how much?:  Yelitza, one every 3 weeks   Juice:  No                        Food Journal  · Reviewed and Discussed: situational   Endocrine: negative  All other systems were reviewed and are negative    Physical Exam:   General appearance: alert, appears stated age, cooperative and mildly obese  Head: Normocephalic, without obvious abnormality, atraumatic  Neck: no yolanda Lisdexamfetamine Dimesylate (VYVANSE) 30 MG Oral Cap; Take 1 capsule (30 mg total) by mouth daily.  -     Lisdexamfetamine Dimesylate (VYVANSE) 30 MG Oral Cap;  Take 1 capsule (30 mg total) by mouth daily.  -     DIETITIAN EDUCATION INITIAL, DIET (INTERNAL) 12-LEAD; Future    Vitamin D deficiency  -     COMP METABOLIC PANEL (14);  Future  -     HEMOGLOBIN A1C; Future  -     TSH+FREE T4; Future  -     VITAMIN D; Future  -     VITAMIN B12; Future  -     DIETITIAN EDUCATION INITIAL, DIET (INTERNAL)  -     EKG 12- cardiac disease or substance abuse. +Binge Eating. Start Vyvanse 30 mg by mouth in the AM.    Consider topiramate next visit- IUD intact.      Discussed risks, benefits, rationale, and side effects of medication(s) including hypertension, palpitations,

## 2021-09-13 NOTE — PATIENT INSTRUCTIONS
Intermittent fast 12 hours. Garden of Life multi-vitamin. I recommend a whole food, plant powered diet with low glycemic index:     Aim for 3 meals a day and 1-3 snacks as needed. Breakfast ideas:  1. Fruit and nuts/seeds.   2. Eggs scrambled wi

## 2021-10-27 ENCOUNTER — OFFICE VISIT (OUTPATIENT)
Dept: SURGERY | Facility: CLINIC | Age: 26
End: 2021-10-27
Payer: COMMERCIAL

## 2021-10-27 VITALS
BODY MASS INDEX: 29.64 KG/M2 | WEIGHT: 173.63 LBS | SYSTOLIC BLOOD PRESSURE: 104 MMHG | HEART RATE: 83 BPM | HEIGHT: 64 IN | OXYGEN SATURATION: 97 % | DIASTOLIC BLOOD PRESSURE: 66 MMHG

## 2021-10-27 DIAGNOSIS — E66.3 PATIENT OVERWEIGHT: ICD-10-CM

## 2021-10-27 DIAGNOSIS — Z86.39 HX OF OBESITY: ICD-10-CM

## 2021-10-27 DIAGNOSIS — R53.83 FATIGUE, UNSPECIFIED TYPE: ICD-10-CM

## 2021-10-27 DIAGNOSIS — E53.8 LOW VITAMIN B12 LEVEL: ICD-10-CM

## 2021-10-27 DIAGNOSIS — L68.0 HIRSUTISM: ICD-10-CM

## 2021-10-27 DIAGNOSIS — R63.2 BINGE EATING: ICD-10-CM

## 2021-10-27 DIAGNOSIS — Z83.3 FH: DIABETES MELLITUS: ICD-10-CM

## 2021-10-27 DIAGNOSIS — E55.9 VITAMIN D DEFICIENCY: ICD-10-CM

## 2021-10-27 DIAGNOSIS — Z51.81 ENCOUNTER FOR THERAPEUTIC DRUG MONITORING: Primary | ICD-10-CM

## 2021-10-27 PROCEDURE — 3008F BODY MASS INDEX DOCD: CPT | Performed by: NURSE PRACTITIONER

## 2021-10-27 PROCEDURE — 3074F SYST BP LT 130 MM HG: CPT | Performed by: NURSE PRACTITIONER

## 2021-10-27 PROCEDURE — 3078F DIAST BP <80 MM HG: CPT | Performed by: NURSE PRACTITIONER

## 2021-10-27 PROCEDURE — 99214 OFFICE O/P EST MOD 30 MIN: CPT | Performed by: NURSE PRACTITIONER

## 2021-10-27 RX ORDER — TOPIRAMATE 25 MG/1
25 TABLET ORAL DAILY
Qty: 30 TABLET | Refills: 1 | Status: SHIPPED | OUTPATIENT
Start: 2021-10-27 | End: 2021-12-15

## 2021-10-27 NOTE — PROGRESS NOTES
[address], [address], [address], [address] [place] [address]  Dept: [phone]       Patient:  Rafaela Richmond  :      1995  MRN:      [id number]    Chief Complaint:  Patient present kg)  09/13/21 : 173 lb 12.8 oz (78.8 kg)  03/09/21 : 165 lb (74.8 kg)  03/01/21 : 163 lb (73.9 kg)  09/17/20 : 159 lb 9.6 oz (72.4 kg)  02/27/20 : 176 lb 3.2 oz (79.9 kg)      Patient Medications:    Current Outpatient Medications   Medication Sig Dispense Not Asked        Seat Belt: Not Asked        Self-Exams: Not Asked    Social History Narrative      Not on file    Social Determinants of Health  Financial Resource Strain:       Difficulty of Paying Living Expenses: Not on file  Food Insecurity:       Wor big can 2-3 per week   Soda Drinker: Yes                 If yes, how much?:  Diet pop- 1 can/day  Sports Drinks:   Yes               If yes, how much?:  Yelitza, one every 3 weeks   Juice:  No                        Food Journal  · Reviewed and Discussed: negative  Musculoskeletal:negative  Neurological: negative  Behavioral/Psych: positive for depression and situational   Endocrine: negative  All other systems were reviewed and are negative    Physical Exam:   General appearance: alert, appears stated age, starting weight: 185 lbs; BMI: 31.81; WC: 38 in.    Patient's goal weight: 155 lbs, then reevaluate   Values: Disease prevention, self, self-worth     Recommended patient continue intensive lifestyle and behavioral modifications at this time for weight loss Patient states understanding of all information and instructions. IUD intact.      Has taken Wellbutrin 150 mg twice a day in the past- +PHQ Score, emotionally driven eating.      Aim for adequate fiber intake - 35-45 g/day.     Continue IF, 12 hours.     M

## 2021-12-15 ENCOUNTER — OFFICE VISIT (OUTPATIENT)
Dept: SURGERY | Facility: CLINIC | Age: 26
End: 2021-12-15
Payer: COMMERCIAL

## 2021-12-15 VITALS
DIASTOLIC BLOOD PRESSURE: 85 MMHG | WEIGHT: 165.81 LBS | BODY MASS INDEX: 28.31 KG/M2 | OXYGEN SATURATION: 98 % | HEART RATE: 83 BPM | HEIGHT: 64 IN | SYSTOLIC BLOOD PRESSURE: 124 MMHG

## 2021-12-15 DIAGNOSIS — E66.3 PATIENT OVERWEIGHT: ICD-10-CM

## 2021-12-15 DIAGNOSIS — L68.0 HIRSUTISM: ICD-10-CM

## 2021-12-15 DIAGNOSIS — Z83.3 FH: DIABETES MELLITUS: ICD-10-CM

## 2021-12-15 DIAGNOSIS — Z86.39 HX OF OBESITY: ICD-10-CM

## 2021-12-15 DIAGNOSIS — E53.8 LOW VITAMIN B12 LEVEL: ICD-10-CM

## 2021-12-15 DIAGNOSIS — E55.9 VITAMIN D DEFICIENCY: ICD-10-CM

## 2021-12-15 DIAGNOSIS — R53.83 FATIGUE, UNSPECIFIED TYPE: ICD-10-CM

## 2021-12-15 DIAGNOSIS — R63.5 WEIGHT GAIN: ICD-10-CM

## 2021-12-15 DIAGNOSIS — R63.2 BINGE EATING: ICD-10-CM

## 2021-12-15 DIAGNOSIS — Z51.81 ENCOUNTER FOR THERAPEUTIC DRUG MONITORING: Primary | ICD-10-CM

## 2021-12-15 PROCEDURE — 99214 OFFICE O/P EST MOD 30 MIN: CPT | Performed by: NURSE PRACTITIONER

## 2021-12-15 PROCEDURE — 3074F SYST BP LT 130 MM HG: CPT | Performed by: NURSE PRACTITIONER

## 2021-12-15 PROCEDURE — 3079F DIAST BP 80-89 MM HG: CPT | Performed by: NURSE PRACTITIONER

## 2021-12-15 PROCEDURE — 3008F BODY MASS INDEX DOCD: CPT | Performed by: NURSE PRACTITIONER

## 2021-12-15 NOTE — PROGRESS NOTES
[address], [address], [address] [place] [address]  Dept: [phone]       Patient:  Sravanthi Gannon  :      1995  MRN:      [id number]    Chief Complaint:  No chief compla oz (72.4 kg)      Patient Medications:    Current Outpatient Medications   Medication Sig Dispense Refill   • Lisdexamfetamine Dimesylate (VYVANSE) 40 MG Oral Cap Take 1 capsule (40 mg total) by mouth daily.  30 capsule 0   • [START ON 1/14/2022] Lisdexamfe Helmet: Not Asked        Seat Belt: Not Asked        Self-Exams: Not Asked    Social History Narrative      Not on file    Social Determinants of Health  Financial Resource Strain: Not on file  Food Insecurity: Not on file  Transportation Needs: Not on speedy lunch    Nutritional Goals  Calorie-controlled diet:  7960-2773, Limit carbohydrates to 100 gms per day, Eat 100-200 calories within 1 hour of waking  and Eat 3-4 cups of fresh fruits or vegetables daily    Behavior Modifications Reviewed and Discussed  Ea encounter diagnosis)  Hx of obesity  Patient overweight  Binge eating  Vitamin d deficiency  Low vitamin b12 level  Hirsutism  Fh: diabetes mellitus  Fatigue, unspecified type  Weight gain    PLAN       Patient is not a candidate for bariatric surgery.  Jovany Alejo 2000 IU/day. No other significant abnormalities.      Reviewed medication options for weight loss in detail with patient.      Denies hx cardiac disease or substance abuse. +Binge Eating.     Continue Vyvanse 40 mg by mouth in the AM. Monitor sleep closel

## 2021-12-15 NOTE — PATIENT INSTRUCTIONS
Start magnesium glycinate 500 mg daily.  NOW, Pure encapsulations  Include ES baths: 1 cup ES, 1/2 cup baking soda, EOs

## 2022-01-11 ENCOUNTER — TELEMEDICINE (OUTPATIENT)
Dept: FAMILY MEDICINE CLINIC | Facility: CLINIC | Age: 27
End: 2022-01-11

## 2022-01-11 DIAGNOSIS — Z86.16 HISTORY OF COVID-19: Primary | ICD-10-CM

## 2022-01-11 PROCEDURE — 99213 OFFICE O/P EST LOW 20 MIN: CPT | Performed by: FAMILY MEDICINE

## 2022-01-11 NOTE — PROGRESS NOTES
This visit is conducted using Telemedicine with live, interactive video and audio during this Coronavirus pandemic. Please note that the following visit was completed using two-way, real-time interactive audio and/or video communication.   This has been B12 level      Past Medical History:   Diagnosis Date   • Obesity            MEDICATION LIST    Current Outpatient Medications:   •  Lisdexamfetamine Dimesylate (VYVANSE) 40 MG Oral Cap, Take 1 capsule (40 mg total) by mouth daily. , Disp: 30 capsule, Rfl: ability. Patient to call back if any change/ worsening of symptoms. No orders of the defined types were placed in this encounter.       Meds This Visit:  Requested Prescriptions      No prescriptions requested or ordered in this encounter       Imag

## 2022-03-17 ENCOUNTER — OFFICE VISIT (OUTPATIENT)
Dept: SURGERY | Facility: CLINIC | Age: 27
End: 2022-03-17
Payer: COMMERCIAL

## 2022-03-17 VITALS
OXYGEN SATURATION: 98 % | HEIGHT: 64 IN | DIASTOLIC BLOOD PRESSURE: 70 MMHG | SYSTOLIC BLOOD PRESSURE: 106 MMHG | WEIGHT: 164.5 LBS | BODY MASS INDEX: 28.09 KG/M2 | HEART RATE: 71 BPM

## 2022-03-17 DIAGNOSIS — E55.9 VITAMIN D DEFICIENCY: ICD-10-CM

## 2022-03-17 DIAGNOSIS — Z51.81 ENCOUNTER FOR THERAPEUTIC DRUG MONITORING: Primary | ICD-10-CM

## 2022-03-17 DIAGNOSIS — Z86.39 HX OF OBESITY: ICD-10-CM

## 2022-03-17 DIAGNOSIS — Z83.3 FH: DIABETES MELLITUS: ICD-10-CM

## 2022-03-17 DIAGNOSIS — L68.0 HIRSUTISM: ICD-10-CM

## 2022-03-17 DIAGNOSIS — E53.8 LOW VITAMIN B12 LEVEL: ICD-10-CM

## 2022-03-17 DIAGNOSIS — R53.83 FATIGUE, UNSPECIFIED TYPE: ICD-10-CM

## 2022-03-17 DIAGNOSIS — E66.3 PATIENT OVERWEIGHT: ICD-10-CM

## 2022-03-17 DIAGNOSIS — R63.2 BINGE EATING: ICD-10-CM

## 2022-03-17 PROCEDURE — 3008F BODY MASS INDEX DOCD: CPT | Performed by: NURSE PRACTITIONER

## 2022-03-17 PROCEDURE — 3078F DIAST BP <80 MM HG: CPT | Performed by: NURSE PRACTITIONER

## 2022-03-17 PROCEDURE — 3074F SYST BP LT 130 MM HG: CPT | Performed by: NURSE PRACTITIONER

## 2022-03-17 PROCEDURE — 99213 OFFICE O/P EST LOW 20 MIN: CPT | Performed by: NURSE PRACTITIONER

## 2022-03-17 RX ORDER — TOPIRAMATE 25 MG/1
25 TABLET ORAL 2 TIMES DAILY
Qty: 30 TABLET | Refills: 2 | Status: SHIPPED | OUTPATIENT
Start: 2022-03-17 | End: 2022-03-17

## 2022-03-17 RX ORDER — TOPIRAMATE 25 MG/1
25 TABLET ORAL DAILY
Qty: 30 TABLET | Refills: 2 | Status: SHIPPED | OUTPATIENT
Start: 2022-03-17 | End: 2022-07-11

## 2022-03-17 NOTE — PATIENT INSTRUCTIONS
Start magnesium glycinate 500 mg daily. NOW, Pure encapsulations  Include ES baths: 1 cup ES, 1/2 cup baking soda, EOs.     Chickpea salad:    1 can chickpeas, whole  Dice celery to your liking  Dice green onion to your liking  1 dill pickle  Dice red pepper to your liking  Garlic to your liking  1 1/2 tsp yellow organic mustard  Fresh dill  1 lemon juiced   Salt and pepper  Chop an avocado on top     Dressin minced garlic clove  Juice from 1/2 lemon  1 1/2 tbsp olive oil  Salt and pepper to taste

## 2022-03-23 ENCOUNTER — TELEPHONE (OUTPATIENT)
Dept: OBGYN CLINIC | Facility: CLINIC | Age: 27
End: 2022-03-23

## 2022-03-23 NOTE — TELEPHONE ENCOUNTER
Patient is wondering if the cramping, dizziness and nausea she has been feeling could be related to her IUD approaching it's expiration date. Please advise. She would prefer a message via [employer] or a detailed voice mail being left.

## 2022-03-23 NOTE — TELEPHONE ENCOUNTER
Pt states she had diarrhea for the last several days, which has lead to nausea and abdominal cramping since Sunday. Pt states she has also had the dry heaves without vomiting. Pt states asking if this is related to her IUD ending its expiration date. Pt denies any VB or spotting and states absent cycle while IUD is in place. Pt informed that notes indicate pt is not due to have IUD exchanged until 1/2023. That s/s are more closely related to GI issues. Pt instructed to continue to monitor and reach out to PCP. Pt instructed to reach out to office if any spotting/bleeding. Pt states understanding. To MAF to review. Thank you.

## 2022-05-09 ENCOUNTER — TELEPHONE (OUTPATIENT)
Dept: OBGYN CLINIC | Facility: CLINIC | Age: 27
End: 2022-05-09

## 2022-05-09 NOTE — TELEPHONE ENCOUNTER
Pt. States that she thinks that her current IUD has  and would like to get it removed and get a new IUD inserted by any female dr. Margaret Chavez states that she thinks that Dr Abraham Lake is the dr who put it in.

## 2022-05-09 NOTE — TELEPHONE ENCOUNTER
Pt had IUD placed in 2017 per notes from [id number] [facility] annual exam visit in 3/2021. Pt stated she is using mirena for contraception purposes only. Pt informed that mirena iUD is good for 7 years if only being used for IUD. Pt stated that she feels the IUD is \"wearing off\" and would like to have it replaced sooner then 7 years. Pt also stated she will be off her parents insurance this year so would like to do exchange prior to that. Pt accepted annual exam with [facility] on 6/6 and pt informed she can discuss IUD replacement at time of visit.

## 2022-05-23 ENCOUNTER — OFFICE VISIT (OUTPATIENT)
Dept: FAMILY MEDICINE CLINIC | Facility: CLINIC | Age: 27
End: 2022-05-23
Payer: COMMERCIAL

## 2022-05-23 VITALS
SYSTOLIC BLOOD PRESSURE: 133 MMHG | DIASTOLIC BLOOD PRESSURE: 86 MMHG | HEART RATE: 96 BPM | HEIGHT: 64 IN | WEIGHT: 161 LBS | BODY MASS INDEX: 27.49 KG/M2

## 2022-05-23 DIAGNOSIS — M25.511 ACUTE PAIN OF RIGHT SHOULDER: Primary | ICD-10-CM

## 2022-05-23 DIAGNOSIS — L70.9 ACNE, UNSPECIFIED ACNE TYPE: ICD-10-CM

## 2022-05-23 PROCEDURE — 3075F SYST BP GE 130 - 139MM HG: CPT | Performed by: FAMILY MEDICINE

## 2022-05-23 PROCEDURE — 3079F DIAST BP 80-89 MM HG: CPT | Performed by: FAMILY MEDICINE

## 2022-05-23 PROCEDURE — 99213 OFFICE O/P EST LOW 20 MIN: CPT | Performed by: FAMILY MEDICINE

## 2022-05-23 PROCEDURE — 3008F BODY MASS INDEX DOCD: CPT | Performed by: FAMILY MEDICINE

## 2022-05-23 RX ORDER — NAPROXEN 500 MG/1
500 TABLET ORAL 2 TIMES DAILY WITH MEALS
Qty: 30 TABLET | Refills: 0 | Status: SHIPPED | OUTPATIENT
Start: 2022-05-23

## 2022-05-23 RX ORDER — ACETAMINOPHEN AND CODEINE PHOSPHATE 300; 30 MG/1; MG/1
1 TABLET ORAL EVERY 6 HOURS PRN
Qty: 15 TABLET | Refills: 0 | Status: SHIPPED | OUTPATIENT
Start: 2022-05-23

## 2022-06-23 ENCOUNTER — OFFICE VISIT (OUTPATIENT)
Dept: SURGERY | Facility: CLINIC | Age: 27
End: 2022-06-23
Payer: COMMERCIAL

## 2022-06-23 VITALS
BODY MASS INDEX: 27.14 KG/M2 | HEIGHT: 64 IN | WEIGHT: 159 LBS | DIASTOLIC BLOOD PRESSURE: 73 MMHG | HEART RATE: 83 BPM | SYSTOLIC BLOOD PRESSURE: 126 MMHG

## 2022-06-23 DIAGNOSIS — Z86.39 HX OF OBESITY: ICD-10-CM

## 2022-06-23 DIAGNOSIS — Z51.81 ENCOUNTER FOR THERAPEUTIC DRUG MONITORING: Primary | ICD-10-CM

## 2022-06-23 DIAGNOSIS — E66.3 PATIENT OVERWEIGHT: ICD-10-CM

## 2022-06-23 DIAGNOSIS — Z83.3 FH: DIABETES MELLITUS: ICD-10-CM

## 2022-06-23 DIAGNOSIS — R53.83 FATIGUE, UNSPECIFIED TYPE: ICD-10-CM

## 2022-06-23 DIAGNOSIS — L68.0 HIRSUTISM: ICD-10-CM

## 2022-06-23 DIAGNOSIS — E55.9 VITAMIN D DEFICIENCY: ICD-10-CM

## 2022-06-23 DIAGNOSIS — R63.2 BINGE EATING: ICD-10-CM

## 2022-06-23 DIAGNOSIS — E53.8 LOW VITAMIN B12 LEVEL: ICD-10-CM

## 2022-06-23 PROCEDURE — 99213 OFFICE O/P EST LOW 20 MIN: CPT | Performed by: NURSE PRACTITIONER

## 2022-06-23 PROCEDURE — 3074F SYST BP LT 130 MM HG: CPT | Performed by: NURSE PRACTITIONER

## 2022-06-23 PROCEDURE — 3078F DIAST BP <80 MM HG: CPT | Performed by: NURSE PRACTITIONER

## 2022-06-23 PROCEDURE — 3008F BODY MASS INDEX DOCD: CPT | Performed by: NURSE PRACTITIONER

## 2022-06-23 RX ORDER — BUPROPION HYDROCHLORIDE 150 MG/1
150 TABLET ORAL DAILY
Qty: 90 TABLET | Refills: 0 | Status: SHIPPED | OUTPATIENT
Start: 2022-06-23

## 2022-07-05 ENCOUNTER — APPOINTMENT (OUTPATIENT)
Dept: URBAN - METROPOLITAN AREA CLINIC 248 | Age: 27
Setting detail: DERMATOLOGY
End: 2022-07-26

## 2022-07-05 DIAGNOSIS — L70.0 ACNE VULGARIS: ICD-10-CM

## 2022-07-05 PROCEDURE — 99203 OFFICE O/P NEW LOW 30 MIN: CPT

## 2022-07-05 PROCEDURE — OTHER PRESCRIPTION: OTHER

## 2022-07-05 PROCEDURE — OTHER COUNSELING: OTHER

## 2022-07-05 PROCEDURE — OTHER PRESCRIPTION MEDICATION MANAGEMENT: OTHER

## 2022-07-05 RX ORDER — KETOCONAZOLE 20 MG/ML
SHAMPOO, SUSPENSION TOPICAL
Qty: 120 | Refills: 2 | Status: ERX | COMMUNITY
Start: 2022-07-05

## 2022-07-05 RX ORDER — SPIRONOLACTONE 50 MG/1
TABLET, FILM COATED ORAL
Qty: 30 | Refills: 2 | Status: ERX | COMMUNITY
Start: 2022-07-05

## 2022-07-05 ASSESSMENT — LOCATION SIMPLE DESCRIPTION DERM
LOCATION SIMPLE: RIGHT CHEEK
LOCATION SIMPLE: UPPER BACK

## 2022-07-05 ASSESSMENT — LOCATION ZONE DERM
LOCATION ZONE: FACE
LOCATION ZONE: TRUNK

## 2022-07-05 ASSESSMENT — LOCATION DETAILED DESCRIPTION DERM
LOCATION DETAILED: SUPERIOR THORACIC SPINE
LOCATION DETAILED: RIGHT INFERIOR CENTRAL MALAR CHEEK

## 2022-07-05 NOTE — PROCEDURE: COUNSELING
Topical Retinoid counseling:  Patient advised to apply a pea-sized amount only at bedtime and wait 30 minutes after washing their face before applying.  If too drying, patient may add a non-comedogenic moisturizer. The patient verbalized understanding of the proper use and possible adverse effects of retinoids.  All of the patient's questions and concerns were addressed.
Sarecycline Counseling: Patient advised regarding possible photosensitivity and discoloration of the teeth, skin, lips, tongue and gums.  Patient instructed to avoid sunlight, if possible.  When exposed to sunlight, patients should wear protective clothing, sunglasses, and sunscreen.  The patient was instructed to call the office immediately if the following severe adverse effects occur:  hearing changes, easy bruising/bleeding, severe headache, or vision changes.  The patient verbalized understanding of the proper use and possible adverse effects of sarecycline.  All of the patient's questions and concerns were addressed.
Aklief counseling:  Patient advised to apply a pea-sized amount only at bedtime and wait 30 minutes after washing their face before applying.  If too drying, patient may add a non-comedogenic moisturizer.  The most commonly reported side effects including irritation, redness, scaling, dryness, stinging, burning, itching, and increased risk of sunburn.  The patient verbalized understanding of the proper use and possible adverse effects of retinoids.  All of the patient's questions and concerns were addressed.
Spironolactone Counseling: Patient advised regarding risks of diarrhea, abdominal pain, hyperkalemia, birth defects (for female patients), liver toxicity and renal toxicity. The patient may need blood work to monitor liver and kidney function and potassium levels while on therapy. The patient verbalized understanding of the proper use and possible adverse effects of spironolactone.  All of the patient's questions and concerns were addressed.
Erythromycin Pregnancy And Lactation Text: This medication is Pregnancy Category B and is considered safe during pregnancy. It is also excreted in breast milk.
Birth Control Pills Counseling: Birth Control Pill Counseling: I discussed with the patient the potential side effects of OCPs including but not limited to increased risk of stroke, heart attack, thrombophlebitis, deep venous thrombosis, hepatic adenomas, breast changes, GI upset, headaches, and depression.  The patient verbalized understanding of the proper use and possible adverse effects of OCPs. All of the patient's questions and concerns were addressed.
Aklief Pregnancy And Lactation Text: It is unknown if this medication is safe to use during pregnancy.  It is unknown if this medication is excreted in breast milk.  Breastfeeding women should use the topical cream on the smallest area of the skin for the shortest time needed while breastfeeding.  Do not apply to nipple and areola.
Topical Sulfur Applications Pregnancy And Lactation Text: This medication is Pregnancy Category C and has an unknown safety profile during pregnancy. It is unknown if this topical medication is excreted in breast milk.
Tazorac Counseling:  Patient advised that medication is irritating and drying.  Patient may need to apply sparingly and wash off after an hour before eventually leaving it on overnight.  The patient verbalized understanding of the proper use and possible adverse effects of tazorac.  All of the patient's questions and concerns were addressed.
Doxycycline Pregnancy And Lactation Text: This medication is Pregnancy Category D and not consider safe during pregnancy. It is also excreted in breast milk but is considered safe for shorter treatment courses.
Topical Clindamycin Counseling: Patient counseled that this medication may cause skin irritation or allergic reactions.  In the event of skin irritation, the patient was advised to reduce the amount of the drug applied or use it less frequently.   The patient verbalized understanding of the proper use and possible adverse effects of clindamycin.  All of the patient's questions and concerns were addressed.
High Dose Vitamin A Counseling: Side effects reviewed, pt to contact office should one occur.
Bactrim Pregnancy And Lactation Text: This medication is Pregnancy Category D and is known to cause fetal risk.  It is also excreted in breast milk.
Bactrim Counseling:  I discussed with the patient the risks of sulfa antibiotics including but not limited to GI upset, allergic reaction, drug rash, diarrhea, dizziness, photosensitivity, and yeast infections.  Rarely, more serious reactions can occur including but not limited to aplastic anemia, agranulocytosis, methemoglobinemia, blood dyscrasias, liver or kidney failure, lung infiltrates or desquamative/blistering drug rashes.
Tetracycline Counseling: Patient counseled regarding possible photosensitivity and increased risk for sunburn.  Patient instructed to avoid sunlight, if possible.  When exposed to sunlight, patients should wear protective clothing, sunglasses, and sunscreen.  The patient was instructed to call the office immediately if the following severe adverse effects occur:  hearing changes, easy bruising/bleeding, severe headache, or vision changes.  The patient verbalized understanding of the proper use and possible adverse effects of tetracycline.  All of the patient's questions and concerns were addressed. Patient understands to avoid pregnancy while on therapy due to potential birth defects.
Topical Clindamycin Pregnancy And Lactation Text: This medication is Pregnancy Category B and is considered safe during pregnancy. It is unknown if it is excreted in breast milk.
Use Enhanced Medication Counseling?: No
Topical Sulfur Applications Counseling: Topical Sulfur Counseling: Patient counseled that this medication may cause skin irritation or allergic reactions.  In the event of skin irritation, the patient was advised to reduce the amount of the drug applied or use it less frequently.   The patient verbalized understanding of the proper use and possible adverse effects of topical sulfur application.  All of the patient's questions and concerns were addressed.
High Dose Vitamin A Pregnancy And Lactation Text: High dose vitamin A therapy is contraindicated during pregnancy and breast feeding.
Benzoyl Peroxide Counseling: Patient counseled that medicine may cause skin irritation and bleach clothing.  In the event of skin irritation, the patient was advised to reduce the amount of the drug applied or use it less frequently.   The patient verbalized understanding of the proper use and possible adverse effects of benzoyl peroxide.  All of the patient's questions and concerns were addressed.
Azithromycin Counseling:  I discussed with the patient the risks of azithromycin including but not limited to GI upset, allergic reaction, drug rash, diarrhea, and yeast infections.
Dapsone Pregnancy And Lactation Text: This medication is Pregnancy Category C and is not considered safe during pregnancy or breast feeding.
Dapsone Counseling: I discussed with the patient the risks of dapsone including but not limited to hemolytic anemia, agranulocytosis, rashes, methemoglobinemia, kidney failure, peripheral neuropathy, headaches, GI upset, and liver toxicity.  Patients who start dapsone require monitoring including baseline LFTs and weekly CBCs for the first month, then every month thereafter.  The patient verbalized understanding of the proper use and possible adverse effects of dapsone.  All of the patient's questions and concerns were addressed.
Minocycline Pregnancy And Lactation Text: This medication is Pregnancy Category D and not consider safe during pregnancy. It is also excreted in breast milk.
Azelaic Acid Counseling: Patient counseled that medicine may cause skin irritation and to avoid applying near the eyes.  In the event of skin irritation, the patient was advised to reduce the amount of the drug applied or use it less frequently.   The patient verbalized understanding of the proper use and possible adverse effects of azelaic acid.  All of the patient's questions and concerns were addressed.
Erythromycin Counseling:  I discussed with the patient the risks of erythromycin including but not limited to GI upset, allergic reaction, drug rash, diarrhea, increase in liver enzymes, and yeast infections.
Benzoyl Peroxide Pregnancy And Lactation Text: This medication is Pregnancy Category C. It is unknown if benzoyl peroxide is excreted in breast milk.
Isotretinoin Counseling: Patient should get monthly blood tests, not donate blood, not drive at night if vision affected, not share medication, and not undergo elective surgery for 6 months after tx completed. Side effects reviewed, pt to contact office should one occur.
Winlevi Counseling:  I discussed with the patient the risks of topical clascoterone including but not limited to erythema, scaling, itching, and stinging. Patient voiced their understanding.
Tazorac Pregnancy And Lactation Text: This medication is not safe during pregnancy. It is unknown if this medication is excreted in breast milk.
Topical Retinoid Pregnancy And Lactation Text: This medication is Pregnancy Category C. It is unknown if this medication is excreted in breast milk.
Doxycycline Counseling:  Patient counseled regarding possible photosensitivity and increased risk for sunburn.  Patient instructed to avoid sunlight, if possible.  When exposed to sunlight, patients should wear protective clothing, sunglasses, and sunscreen.  The patient was instructed to call the office immediately if the following severe adverse effects occur:  hearing changes, easy bruising/bleeding, severe headache, or vision changes.  The patient verbalized understanding of the proper use and possible adverse effects of doxycycline.  All of the patient's questions and concerns were addressed.
Winlevi Pregnancy And Lactation Text: This medication is considered safe during pregnancy and breastfeeding.
Detail Level: Zone
Azithromycin Pregnancy And Lactation Text: This medication is considered safe during pregnancy and is also secreted in breast milk.
Azelaic Acid Pregnancy And Lactation Text: This medication is considered safe during pregnancy and breast feeding.
Birth Control Pills Pregnancy And Lactation Text: This medication should be avoided if pregnant and for the first 30 days post-partum.
Minocycline Counseling: Patient advised regarding possible photosensitivity and discoloration of the teeth, skin, lips, tongue and gums.  Patient instructed to avoid sunlight, if possible.  When exposed to sunlight, patients should wear protective clothing, sunglasses, and sunscreen.  The patient was instructed to call the office immediately if the following severe adverse effects occur:  hearing changes, easy bruising/bleeding, severe headache, or vision changes.  The patient verbalized understanding of the proper use and possible adverse effects of minocycline.  All of the patient's questions and concerns were addressed.
Spironolactone Pregnancy And Lactation Text: This medication can cause feminization of the male fetus and should be avoided during pregnancy. The active metabolite is also found in breast milk.
Isotretinoin Pregnancy And Lactation Text: This medication is Pregnancy Category X and is considered extremely dangerous during pregnancy. It is unknown if it is excreted in breast milk.

## 2022-07-05 NOTE — HPI: ACNE (PATIENT REPORTED)
Where Is Your Acne Located?: Face,back
List Over The Counter Products You Tried (Separate Each Name With A Comma):: Cetaphil wash,

## 2022-07-05 NOTE — PROCEDURE: PRESCRIPTION MEDICATION MANAGEMENT
Render In Strict Bullet Format?: No
Detail Level: Zone
Initiate Treatment: Keto 2 shampoo, miranda 50 mg
Initiate Treatment: Keto 2 shampoo

## 2022-07-11 ENCOUNTER — TELEPHONE (OUTPATIENT)
Dept: FAMILY MEDICINE CLINIC | Facility: CLINIC | Age: 27
End: 2022-07-11

## 2022-07-11 NOTE — TELEPHONE ENCOUNTER
Problems   The patient or proxy has not reviewed this information, and there are updates pending:   Requested Problem Removals Date Noted Reported By  Comments   Obesity (BMI 30-39.9) 11/13/2019 Shaw Walter    Weight gain 11/27/2019 Shaw Walter    Fatigue 11/27/2019 Shaw Walter    Hx of obesity 9/17/2020 Shaw Walter    FH: diabetes mellitus 11/13/2019 Shaw Watler

## 2022-07-13 ENCOUNTER — OFFICE VISIT (OUTPATIENT)
Dept: OBGYN CLINIC | Facility: CLINIC | Age: 27
End: 2022-07-13
Payer: COMMERCIAL

## 2022-07-13 VITALS
BODY MASS INDEX: 25.5 KG/M2 | DIASTOLIC BLOOD PRESSURE: 72 MMHG | HEIGHT: 64 IN | WEIGHT: 149.38 LBS | SYSTOLIC BLOOD PRESSURE: 126 MMHG

## 2022-07-13 DIAGNOSIS — Z30.431 IUD CHECK UP: Primary | ICD-10-CM

## 2022-07-13 DIAGNOSIS — Z11.3 SCREEN FOR STD (SEXUALLY TRANSMITTED DISEASE): ICD-10-CM

## 2022-07-13 PROCEDURE — 3074F SYST BP LT 130 MM HG: CPT | Performed by: OBSTETRICS & GYNECOLOGY

## 2022-07-13 PROCEDURE — 99203 OFFICE O/P NEW LOW 30 MIN: CPT | Performed by: OBSTETRICS & GYNECOLOGY

## 2022-07-13 PROCEDURE — 87491 CHLMYD TRACH DNA AMP PROBE: CPT | Performed by: OBSTETRICS & GYNECOLOGY

## 2022-07-13 PROCEDURE — 3078F DIAST BP <80 MM HG: CPT | Performed by: OBSTETRICS & GYNECOLOGY

## 2022-07-13 PROCEDURE — 87591 N.GONORRHOEAE DNA AMP PROB: CPT | Performed by: OBSTETRICS & GYNECOLOGY

## 2022-07-13 PROCEDURE — 3008F BODY MASS INDEX DOCD: CPT | Performed by: OBSTETRICS & GYNECOLOGY

## 2022-07-14 LAB
C TRACH DNA SPEC QL NAA+PROBE: NEGATIVE
N GONORRHOEA DNA SPEC QL NAA+PROBE: NEGATIVE

## 2022-09-20 ENCOUNTER — APPOINTMENT (OUTPATIENT)
Dept: URBAN - METROPOLITAN AREA CLINIC 248 | Age: 27
Setting detail: DERMATOLOGY
End: 2022-09-30

## 2022-09-20 DIAGNOSIS — L70.0 ACNE VULGARIS: ICD-10-CM

## 2022-09-20 PROCEDURE — 99213 OFFICE O/P EST LOW 20 MIN: CPT

## 2022-09-20 PROCEDURE — OTHER PRESCRIPTION MEDICATION MANAGEMENT: OTHER

## 2022-09-20 PROCEDURE — OTHER COUNSELING: OTHER

## 2022-09-20 PROCEDURE — OTHER PRESCRIPTION: OTHER

## 2022-09-20 RX ORDER — TRETIONIN 0.25 MG/G
CREAM TOPICAL
Qty: 20 | Refills: 5 | Status: ERX | COMMUNITY
Start: 2022-09-20

## 2022-09-20 RX ORDER — SPIRONOLACTONE 50 MG/1
TABLET, FILM COATED ORAL
Qty: 60 | Refills: 3 | Status: ERX

## 2022-09-20 NOTE — PROCEDURE: PRESCRIPTION MEDICATION MANAGEMENT
Initiate Treatment: tretinoin 0.025 % topical cream \\nSig: Apply to face as tolerated once nightly with moisturizer
Render In Strict Bullet Format?: No
Modify Regimen: spironolactone 50 mg tablet \\nSig: change from once to two pill daily
Detail Level: Zone

## 2022-09-20 NOTE — PROCEDURE: COUNSELING
Topical Sulfur Applications Counseling: Topical Sulfur Counseling: Patient counseled that this medication may cause skin irritation or allergic reactions.  In the event of skin irritation, the patient was advised to reduce the amount of the drug applied or use it less frequently.   The patient verbalized understanding of the proper use and possible adverse effects of topical sulfur application.  All of the patient's questions and concerns were addressed.
Minocycline Pregnancy And Lactation Text: This medication is Pregnancy Category D and not consider safe during pregnancy. It is also excreted in breast milk.
Detail Level: Zone
Topical Sulfur Applications Pregnancy And Lactation Text: This medication is Pregnancy Category C and has an unknown safety profile during pregnancy. It is unknown if this topical medication is excreted in breast milk.
Benzoyl Peroxide Counseling: Patient counseled that medicine may cause skin irritation and bleach clothing.  In the event of skin irritation, the patient was advised to reduce the amount of the drug applied or use it less frequently.   The patient verbalized understanding of the proper use and possible adverse effects of benzoyl peroxide.  All of the patient's questions and concerns were addressed.
High Dose Vitamin A Pregnancy And Lactation Text: High dose vitamin A therapy is contraindicated during pregnancy and breast feeding.
Azelaic Acid Counseling: Patient counseled that medicine may cause skin irritation and to avoid applying near the eyes.  In the event of skin irritation, the patient was advised to reduce the amount of the drug applied or use it less frequently.   The patient verbalized understanding of the proper use and possible adverse effects of azelaic acid.  All of the patient's questions and concerns were addressed.
Spironolactone Counseling: Patient advised regarding risks of diarrhea, abdominal pain, hyperkalemia, birth defects (for female patients), liver toxicity and renal toxicity. The patient may need blood work to monitor liver and kidney function and potassium levels while on therapy. The patient verbalized understanding of the proper use and possible adverse effects of spironolactone.  All of the patient's questions and concerns were addressed.
Tazorac Counseling:  Patient advised that medication is irritating and drying.  Patient may need to apply sparingly and wash off after an hour before eventually leaving it on overnight.  The patient verbalized understanding of the proper use and possible adverse effects of tazorac.  All of the patient's questions and concerns were addressed.
Topical Clindamycin Pregnancy And Lactation Text: This medication is Pregnancy Category B and is considered safe during pregnancy. It is unknown if it is excreted in breast milk.
Tazorac Pregnancy And Lactation Text: This medication is not safe during pregnancy. It is unknown if this medication is excreted in breast milk.
Azelaic Acid Pregnancy And Lactation Text: This medication is considered safe during pregnancy and breast feeding.
Isotretinoin Counseling: Patient should get monthly blood tests, not donate blood, not drive at night if vision affected, not share medication, and not undergo elective surgery for 6 months after tx completed. Side effects reviewed, pt to contact office should one occur.
Azithromycin Counseling:  I discussed with the patient the risks of azithromycin including but not limited to GI upset, allergic reaction, drug rash, diarrhea, and yeast infections.
High Dose Vitamin A Counseling: Side effects reviewed, pt to contact office should one occur.
Doxycycline Pregnancy And Lactation Text: This medication is Pregnancy Category D and not consider safe during pregnancy. It is also excreted in breast milk but is considered safe for shorter treatment courses.
Azithromycin Pregnancy And Lactation Text: This medication is considered safe during pregnancy and is also secreted in breast milk.
Sarecycline Counseling: Patient advised regarding possible photosensitivity and discoloration of the teeth, skin, lips, tongue and gums.  Patient instructed to avoid sunlight, if possible.  When exposed to sunlight, patients should wear protective clothing, sunglasses, and sunscreen.  The patient was instructed to call the office immediately if the following severe adverse effects occur:  hearing changes, easy bruising/bleeding, severe headache, or vision changes.  The patient verbalized understanding of the proper use and possible adverse effects of sarecycline.  All of the patient's questions and concerns were addressed.
Winlevi Counseling:  I discussed with the patient the risks of topical clascoterone including but not limited to erythema, scaling, itching, and stinging. Patient voiced their understanding.
Use Enhanced Medication Counseling?: No
Spironolactone Pregnancy And Lactation Text: This medication can cause feminization of the male fetus and should be avoided during pregnancy. The active metabolite is also found in breast milk.
Isotretinoin Pregnancy And Lactation Text: This medication is Pregnancy Category X and is considered extremely dangerous during pregnancy. It is unknown if it is excreted in breast milk.
Tetracycline Counseling: Patient counseled regarding possible photosensitivity and increased risk for sunburn.  Patient instructed to avoid sunlight, if possible.  When exposed to sunlight, patients should wear protective clothing, sunglasses, and sunscreen.  The patient was instructed to call the office immediately if the following severe adverse effects occur:  hearing changes, easy bruising/bleeding, severe headache, or vision changes.  The patient verbalized understanding of the proper use and possible adverse effects of tetracycline.  All of the patient's questions and concerns were addressed. Patient understands to avoid pregnancy while on therapy due to potential birth defects.
Bactrim Pregnancy And Lactation Text: This medication is Pregnancy Category D and is known to cause fetal risk.  It is also excreted in breast milk.
Doxycycline Counseling:  Patient counseled regarding possible photosensitivity and increased risk for sunburn.  Patient instructed to avoid sunlight, if possible.  When exposed to sunlight, patients should wear protective clothing, sunglasses, and sunscreen.  The patient was instructed to call the office immediately if the following severe adverse effects occur:  hearing changes, easy bruising/bleeding, severe headache, or vision changes.  The patient verbalized understanding of the proper use and possible adverse effects of doxycycline.  All of the patient's questions and concerns were addressed.
Aklief Pregnancy And Lactation Text: It is unknown if this medication is safe to use during pregnancy.  It is unknown if this medication is excreted in breast milk.  Breastfeeding women should use the topical cream on the smallest area of the skin for the shortest time needed while breastfeeding.  Do not apply to nipple and areola.
Topical Retinoid Pregnancy And Lactation Text: This medication is Pregnancy Category C. It is unknown if this medication is excreted in breast milk.
Birth Control Pills Pregnancy And Lactation Text: This medication should be avoided if pregnant and for the first 30 days post-partum.
Birth Control Pills Counseling: Birth Control Pill Counseling: I discussed with the patient the potential side effects of OCPs including but not limited to increased risk of stroke, heart attack, thrombophlebitis, deep venous thrombosis, hepatic adenomas, breast changes, GI upset, headaches, and depression.  The patient verbalized understanding of the proper use and possible adverse effects of OCPs. All of the patient's questions and concerns were addressed.
Dapsone Counseling: I discussed with the patient the risks of dapsone including but not limited to hemolytic anemia, agranulocytosis, rashes, methemoglobinemia, kidney failure, peripheral neuropathy, headaches, GI upset, and liver toxicity.  Patients who start dapsone require monitoring including baseline LFTs and weekly CBCs for the first month, then every month thereafter.  The patient verbalized understanding of the proper use and possible adverse effects of dapsone.  All of the patient's questions and concerns were addressed.
Dapsone Pregnancy And Lactation Text: This medication is Pregnancy Category C and is not considered safe during pregnancy or breast feeding.
Minocycline Counseling: Patient advised regarding possible photosensitivity and discoloration of the teeth, skin, lips, tongue and gums.  Patient instructed to avoid sunlight, if possible.  When exposed to sunlight, patients should wear protective clothing, sunglasses, and sunscreen.  The patient was instructed to call the office immediately if the following severe adverse effects occur:  hearing changes, easy bruising/bleeding, severe headache, or vision changes.  The patient verbalized understanding of the proper use and possible adverse effects of minocycline.  All of the patient's questions and concerns were addressed.
Bactrim Counseling:  I discussed with the patient the risks of sulfa antibiotics including but not limited to GI upset, allergic reaction, drug rash, diarrhea, dizziness, photosensitivity, and yeast infections.  Rarely, more serious reactions can occur including but not limited to aplastic anemia, agranulocytosis, methemoglobinemia, blood dyscrasias, liver or kidney failure, lung infiltrates or desquamative/blistering drug rashes.
Erythromycin Pregnancy And Lactation Text: This medication is Pregnancy Category B and is considered safe during pregnancy. It is also excreted in breast milk.
Aklief counseling:  Patient advised to apply a pea-sized amount only at bedtime and wait 30 minutes after washing their face before applying.  If too drying, patient may add a non-comedogenic moisturizer.  The most commonly reported side effects including irritation, redness, scaling, dryness, stinging, burning, itching, and increased risk of sunburn.  The patient verbalized understanding of the proper use and possible adverse effects of retinoids.  All of the patient's questions and concerns were addressed.
Benzoyl Peroxide Pregnancy And Lactation Text: This medication is Pregnancy Category C. It is unknown if benzoyl peroxide is excreted in breast milk.
Erythromycin Counseling:  I discussed with the patient the risks of erythromycin including but not limited to GI upset, allergic reaction, drug rash, diarrhea, increase in liver enzymes, and yeast infections.
Topical Clindamycin Counseling: Patient counseled that this medication may cause skin irritation or allergic reactions.  In the event of skin irritation, the patient was advised to reduce the amount of the drug applied or use it less frequently.   The patient verbalized understanding of the proper use and possible adverse effects of clindamycin.  All of the patient's questions and concerns were addressed.
Winlevi Pregnancy And Lactation Text: This medication is considered safe during pregnancy and breastfeeding.
Topical Retinoid counseling:  Patient advised to apply a pea-sized amount only at bedtime and wait 30 minutes after washing their face before applying.  If too drying, patient may add a non-comedogenic moisturizer. The patient verbalized understanding of the proper use and possible adverse effects of retinoids.  All of the patient's questions and concerns were addressed.

## 2022-09-26 RX ORDER — BUPROPION HYDROCHLORIDE 150 MG/1
TABLET ORAL
Qty: 90 TABLET | Refills: 0 | OUTPATIENT
Start: 2022-09-26

## 2022-09-26 RX ORDER — BUPROPION HYDROCHLORIDE 150 MG/1
150 TABLET ORAL DAILY
Qty: 90 TABLET | Refills: 0 | Status: SHIPPED | OUTPATIENT
Start: 2022-09-26

## 2022-10-01 ENCOUNTER — TELEMEDICINE (OUTPATIENT)
Dept: TELEHEALTH | Age: 27
End: 2022-10-01

## 2022-10-01 VITALS — BODY MASS INDEX: 26.22 KG/M2 | WEIGHT: 148 LBS | HEIGHT: 63 IN

## 2022-10-01 DIAGNOSIS — J30.2 SEASONAL ALLERGIES: ICD-10-CM

## 2022-10-01 DIAGNOSIS — B34.9 VIRAL SYNDROME: Primary | ICD-10-CM

## 2022-10-01 RX ORDER — SPIRONOLACTONE 50 MG/1
100 TABLET, FILM COATED ORAL DAILY
COMMUNITY
Start: 2022-09-20

## 2022-11-10 ENCOUNTER — OFFICE VISIT (OUTPATIENT)
Dept: SURGERY | Facility: CLINIC | Age: 27
End: 2022-11-10
Payer: COMMERCIAL

## 2022-11-10 VITALS
HEIGHT: 64 IN | WEIGHT: 156 LBS | OXYGEN SATURATION: 98 % | SYSTOLIC BLOOD PRESSURE: 122 MMHG | HEART RATE: 94 BPM | BODY MASS INDEX: 26.63 KG/M2 | DIASTOLIC BLOOD PRESSURE: 82 MMHG

## 2022-11-10 DIAGNOSIS — Z86.39 HX OF OBESITY: ICD-10-CM

## 2022-11-10 DIAGNOSIS — E53.8 LOW VITAMIN B12 LEVEL: ICD-10-CM

## 2022-11-10 DIAGNOSIS — Z51.81 ENCOUNTER FOR THERAPEUTIC DRUG MONITORING: Primary | ICD-10-CM

## 2022-11-10 DIAGNOSIS — E66.3 PATIENT OVERWEIGHT: ICD-10-CM

## 2022-11-10 DIAGNOSIS — R63.2 BINGE EATING: ICD-10-CM

## 2022-11-10 DIAGNOSIS — L68.0 HIRSUTISM: ICD-10-CM

## 2022-11-10 DIAGNOSIS — Z83.3 FH: DIABETES MELLITUS: ICD-10-CM

## 2022-11-10 DIAGNOSIS — R53.83 FATIGUE, UNSPECIFIED TYPE: ICD-10-CM

## 2022-11-10 DIAGNOSIS — E55.9 VITAMIN D DEFICIENCY: ICD-10-CM

## 2022-11-10 PROCEDURE — 99213 OFFICE O/P EST LOW 20 MIN: CPT | Performed by: NURSE PRACTITIONER

## 2022-11-10 PROCEDURE — 3008F BODY MASS INDEX DOCD: CPT | Performed by: NURSE PRACTITIONER

## 2022-11-10 PROCEDURE — 3079F DIAST BP 80-89 MM HG: CPT | Performed by: NURSE PRACTITIONER

## 2022-11-10 PROCEDURE — 3074F SYST BP LT 130 MM HG: CPT | Performed by: NURSE PRACTITIONER

## 2022-11-10 NOTE — PATIENT INSTRUCTIONS
Daily Om    Belly Dancing     Sharon     Add PGX 1-2 capsules with dinner     Retrial magnesium    Increase Vyvanse    3 day, 5 day, 7 day, 10 day challenges- repeat

## 2022-12-31 ENCOUNTER — HOSPITAL ENCOUNTER (OUTPATIENT)
Age: 27
Discharge: HOME | End: 2022-12-31
Payer: COMMERCIAL

## 2022-12-31 VITALS
DIASTOLIC BLOOD PRESSURE: 69 MMHG | SYSTOLIC BLOOD PRESSURE: 135 MMHG | RESPIRATION RATE: 18 BRPM | TEMPERATURE: 97 F | HEART RATE: 88 BPM | OXYGEN SATURATION: 100 %

## 2022-12-31 DIAGNOSIS — B37.31 VULVOVAGINAL CANDIDIASIS: Primary | ICD-10-CM

## 2022-12-31 LAB
B-HCG UR QL: NEGATIVE
BILIRUB UR QL STRIP: NEGATIVE
COLOR UR: YELLOW
GLUCOSE UR STRIP-MCNC: NEGATIVE MG/DL
HGB UR QL STRIP: NEGATIVE
KETONES UR STRIP-MCNC: NEGATIVE MG/DL
LEUKOCYTE ESTERASE UR QL STRIP: NEGATIVE
NITRITE UR QL STRIP: NEGATIVE
PH UR STRIP: 6.5 [PH]
SP GR UR STRIP: 1.02
UROBILINOGEN UR STRIP-ACNC: <2 MG/DL

## 2022-12-31 PROCEDURE — 87491 CHLMYD TRACH DNA AMP PROBE: CPT | Performed by: PHYSICIAN ASSISTANT

## 2022-12-31 PROCEDURE — 87205 SMEAR GRAM STAIN: CPT | Performed by: PHYSICIAN ASSISTANT

## 2022-12-31 PROCEDURE — 81002 URINALYSIS NONAUTO W/O SCOPE: CPT

## 2022-12-31 PROCEDURE — 87106 FUNGI IDENTIFICATION YEAST: CPT | Performed by: PHYSICIAN ASSISTANT

## 2022-12-31 PROCEDURE — 99214 OFFICE O/P EST MOD 30 MIN: CPT

## 2022-12-31 PROCEDURE — 81025 URINE PREGNANCY TEST: CPT

## 2022-12-31 PROCEDURE — 99204 OFFICE O/P NEW MOD 45 MIN: CPT

## 2022-12-31 PROCEDURE — 87808 TRICHOMONAS ASSAY W/OPTIC: CPT | Performed by: PHYSICIAN ASSISTANT

## 2022-12-31 PROCEDURE — 87591 N.GONORRHOEAE DNA AMP PROB: CPT | Performed by: PHYSICIAN ASSISTANT

## 2022-12-31 RX ORDER — FLUCONAZOLE 150 MG/1
150 TABLET ORAL ONCE
Qty: 2 TABLET | Refills: 0 | Status: SHIPPED | OUTPATIENT
Start: 2022-12-31 | End: 2022-12-31

## 2022-12-31 RX ORDER — CLOTRIMAZOLE AND BETAMETHASONE DIPROPIONATE 10; .64 MG/G; MG/G
1 CREAM TOPICAL 2 TIMES DAILY
Qty: 45 G | Refills: 0 | Status: SHIPPED | OUTPATIENT
Start: 2022-12-31 | End: 2023-01-07

## 2022-12-31 NOTE — ED INITIAL ASSESSMENT (HPI)
Pt here with concern for yeast infection has used monistat 2 times in the last week with no relief. Reports vaginal irritation and swelling.

## 2023-01-02 LAB
C TRACH DNA SPEC QL NAA+PROBE: NEGATIVE
GENITAL VAGINOSIS SCREEN: NEGATIVE
N GONORRHOEA DNA SPEC QL NAA+PROBE: NEGATIVE
TRICHOMONAS SCREEN: NEGATIVE

## 2023-01-09 RX ORDER — BUPROPION HYDROCHLORIDE 150 MG/1
150 TABLET ORAL DAILY
Qty: 90 TABLET | Refills: 0 | Status: SHIPPED | OUTPATIENT
Start: 2023-01-09

## 2023-03-16 DIAGNOSIS — R63.2 BINGE EATING: Primary | ICD-10-CM

## 2023-03-16 DIAGNOSIS — Z86.39 HX OF OBESITY: ICD-10-CM

## 2023-03-16 DIAGNOSIS — Z51.81 ENCOUNTER FOR THERAPEUTIC DRUG MONITORING: ICD-10-CM

## 2023-03-16 RX ORDER — BUPROPION HYDROCHLORIDE 150 MG/1
150 TABLET ORAL DAILY
Qty: 90 TABLET | Refills: 0 | Status: SHIPPED | OUTPATIENT
Start: 2023-03-16

## 2023-04-26 DIAGNOSIS — R63.2 BINGE EATING: ICD-10-CM

## 2023-04-26 DIAGNOSIS — Z86.39 HX OF OBESITY: ICD-10-CM

## 2023-04-28 DIAGNOSIS — R63.2 BINGE EATING: Primary | ICD-10-CM

## 2023-04-28 DIAGNOSIS — E66.3 PATIENT OVERWEIGHT: ICD-10-CM

## 2023-04-28 RX ORDER — DIETHYLPROPION HYDROCHLORIDE 75 MG/1
1 TABLET ORAL EVERY MORNING
Qty: 30 TABLET | Refills: 2 | Status: SHIPPED | OUTPATIENT
Start: 2023-04-28

## 2023-05-11 ENCOUNTER — TELEMEDICINE (OUTPATIENT)
Dept: SURGERY | Facility: CLINIC | Age: 28
End: 2023-05-11
Payer: COMMERCIAL

## 2023-05-11 VITALS — WEIGHT: 168 LBS | BODY MASS INDEX: 29 KG/M2

## 2023-05-11 DIAGNOSIS — R53.83 FATIGUE, UNSPECIFIED TYPE: ICD-10-CM

## 2023-05-11 DIAGNOSIS — E66.3 PATIENT OVERWEIGHT: ICD-10-CM

## 2023-05-11 DIAGNOSIS — R63.2 BINGE EATING: ICD-10-CM

## 2023-05-11 DIAGNOSIS — Z51.81 ENCOUNTER FOR THERAPEUTIC DRUG MONITORING: Primary | ICD-10-CM

## 2023-05-11 DIAGNOSIS — E53.8 LOW VITAMIN B12 LEVEL: ICD-10-CM

## 2023-05-11 DIAGNOSIS — Z83.3 FH: DIABETES MELLITUS: ICD-10-CM

## 2023-05-11 DIAGNOSIS — L68.0 HIRSUTISM: ICD-10-CM

## 2023-05-11 DIAGNOSIS — Z86.39 HX OF OBESITY: ICD-10-CM

## 2023-05-11 DIAGNOSIS — E55.9 VITAMIN D DEFICIENCY: ICD-10-CM

## 2023-05-11 PROCEDURE — 99213 OFFICE O/P EST LOW 20 MIN: CPT | Performed by: NURSE PRACTITIONER

## 2023-05-11 RX ORDER — SPIRONOLACTONE 50 MG/1
100 TABLET, FILM COATED ORAL DAILY
Qty: 90 TABLET | Refills: 1 | Status: SHIPPED | OUTPATIENT
Start: 2023-05-11

## 2023-05-11 RX ORDER — BUPROPION HYDROCHLORIDE 150 MG/1
150 TABLET ORAL DAILY
Qty: 90 TABLET | Refills: 1 | Status: SHIPPED | OUTPATIENT
Start: 2023-05-11

## 2023-06-01 DIAGNOSIS — R63.2 BINGE EATING: Primary | ICD-10-CM

## 2023-07-26 DIAGNOSIS — R63.2 BINGE EATING: ICD-10-CM

## 2023-08-03 DIAGNOSIS — R63.2 BINGE EATING: ICD-10-CM

## 2023-08-10 ENCOUNTER — TELEPHONE (OUTPATIENT)
Dept: TELEHEALTH | Age: 28
End: 2023-08-10

## 2023-08-10 ENCOUNTER — TELEMEDICINE (OUTPATIENT)
Dept: TELEHEALTH | Age: 28
End: 2023-08-10

## 2023-08-10 ENCOUNTER — LAB ENCOUNTER (OUTPATIENT)
Dept: LAB | Age: 28
End: 2023-08-10
Attending: PHYSICIAN ASSISTANT

## 2023-08-10 DIAGNOSIS — J02.9 SORE THROAT: ICD-10-CM

## 2023-08-10 DIAGNOSIS — B97.89 VIRAL SINUSITIS: Primary | ICD-10-CM

## 2023-08-10 DIAGNOSIS — J32.9 VIRAL SINUSITIS: Primary | ICD-10-CM

## 2023-08-10 DIAGNOSIS — J02.9 SORE THROAT: Primary | ICD-10-CM

## 2023-08-10 DIAGNOSIS — R51.9 SINUS HEADACHE: ICD-10-CM

## 2023-08-10 LAB
BETA STREP GRP A SCREEN: NEGATIVE
SARS-COV-2 RNA RESP QL NAA+PROBE: NOT DETECTED

## 2023-08-10 PROCEDURE — 99213 OFFICE O/P EST LOW 20 MIN: CPT | Performed by: PHYSICIAN ASSISTANT

## 2023-08-10 PROCEDURE — 87430 STREP A AG IA: CPT

## 2023-08-10 RX ORDER — PREDNISONE 20 MG/1
40 TABLET ORAL DAILY
Qty: 10 TABLET | Refills: 0 | Status: SHIPPED | OUTPATIENT
Start: 2023-08-10 | End: 2023-08-15

## 2023-08-10 NOTE — PATIENT INSTRUCTIONS
I have placed the order for strep testing via the [facility]. You should get an invitation via [employer] to schedule this appointment. If you are having difficulty scheduling via [employer] you can call Central Scheduling for assistance at [phone]. The Drive Thru is located at [address] [place] [address]. It is located [facility] [facility] of [address] [place] [address] on [place] [id number]. Typically it takes about 30-60 minutes to get results. I will reach out once the results are finalized.

## 2023-08-10 NOTE — TELEPHONE ENCOUNTER
Patient informed of negative COVID and rapid strep. Recent Results (from the past 24 hour(s))   RAPID STREP A SCREEN (LC)    Collection Time: 08/10/23  4:58 PM    Specimen: Throat; Other   Result Value Ref Range    Beta Strep Grp A Screen Negative Negative   RAPID SARS-COV-2 BY PCR    Collection Time: 08/10/23  4:58 PM    Specimen: Nares; Other   Result Value Ref Range    Rapid SARS-CoV-2 by PCR Not Detected Not Detected       Patient with severe seasonal allergies and severe sinus pressure/ sore throat in the last day or so. Will cover with short course of oral steroid. Advised taking antihistamine, +/- Flonase    Patient voiced understanding and agreement. Requested Prescriptions     Signed Prescriptions Disp Refills    predniSONE 20 MG Oral Tab 10 tablet 0     Sig: Take 2 tablets (40 mg total) by mouth daily for 5 days. Authorizing Provider: Ne Abebe     Risks,benefits, and side effects of medications.

## 2023-08-10 NOTE — PROGRESS NOTES
Virtual/Telephone Check-In    Danya Kirkland verbally consents to a Virtual/Telephone Check-In service on 08/10/23. Patient has been referred to the [facility] website at [email or web address] to review the yearly Consent to Treat document. Patient understands and accepts financial responsibility for any deductible, co-insurance and/or co-pays associated with this service. Telehealth Verbal Consent   I conducted a telehealth visit with Danya Kirkland today, 08/10/23, which was completed using two-way, real-time interactive audio and video communication. This has been done in good young to provide continuity of care in the best interest of the provider-patient relationship, due to the COVID -19 public health crisis/national emergency where restrictions of face-to-face office visits are ongoing. Every conscious effort was taken to allow for sufficient and adequate time to complete the visit. The patient was made aware of the limitations of the telehealth visit, including treatment limitations as no physical exam could be performed. The patient was advised to call 911 or to go to the ER in case there was an emergency. The patient was also advised of the potential privacy & security concerns related to the telehealth platform. The patient was made aware of where to find [facility] notice of privacy practices, telehealth consent form and other related consent forms and documents. which are located on the [facility] website. The patient verbally agreed to telehealth consent form, related consents and the risks discussed. Lastly, the patient confirmed that they were in [place]. Included in this visit, time may have been spent reviewing labs, medications, radiology tests and decision making. Appropriate medical decision-making and tests are ordered as detailed in the plan of care above. Coding/billing information is submitted for this visit based on complexity of care and/or time spent for the visit.     CHIEF COMPLAINT: Patient presents with:  Sore Throat: Sore throat last night, lots of sinus pressure/sinus headache      HPI:   Ken Wilkins is a 34year old female who presents for a video visit. Patient reports sore throat starting last night. Reports lots of sinus pressure/headache. No cough. No fever. No N/V. Magdy Jana this past week, but no known sick contacts. Patient has tried popsicles for symptoms, which has mildly seemed to help sore throat. Has not taken COVID test as she didn't think sx were potentially COVID sx. Current Outpatient Medications   Medication Sig Dispense Refill    Lisdexamfetamine Dimesylate (VYVANSE) 60 MG Oral Cap Take 1 capsule (60 mg total) by mouth daily. 30 capsule 0    buPROPion  MG Oral Tablet 24 Hr Take 1 tablet (150 mg total) by mouth daily. 90 tablet 1    spironolactone 50 MG Oral Tab Take 2 tablets (100 mg total) by mouth daily. 90 tablet 1      Past Medical History:   Diagnosis Date    Obesity       Past Surgical History:   Procedure Laterality Date    INSERT INTRAUTERINE DEVICE      Mirena 1/2017         Social History     Socioeconomic History    Marital status: Single   Occupational History    Occupation: Student     Comment: at [employer]. Also works at [employer] [place] Use    Smoking status: Light Smoker    Smokeless tobacco: Never    Tobacco comments:     once a month   Substance and Sexual Activity    Alcohol use:  Yes     Alcohol/week: 0.0 standard drinks of alcohol     Comment: 1 x week    Drug use: Yes     Frequency: 1.0 times per week     Types: Cannabis     Comment: once a week    Sexual activity: Yes     Partners: Male     Birth control/protection: Mirena     Comment: new partner         REVIEW OF SYSTEMS:   GENERAL: feels unwell  SKIN: no rashes or abnormal skin lesions  HEENT: See HPI  LUNGS: denies shortness of breath or wheezing, See HPI  CARDIOVASCULAR: denies chest pain or palpitations   GI: denies N/V/C or abdominal pain      EXAM:   General: Alert, Well-appearing, and In no acute distress  Respiratory:   Speaking in full sentences comfortably  Normal work of breathing  No cough during visit  Head: Normocephalic  Nose: No obvious nasal discharge. But sounds quite nasally/congested. Skin: No obvious rashes or lesions from what observed. No results found for this or any previous visit (from the past 24 hour(s)). ASSESSMENT AND PLAN:   Melissa Sullivan is a 34year old female who presents with symptoms that are consistent with    ASSESSMENT:   Sore throat  (primary encounter diagnosis)  Sinus headache    PLAN: Patient with 24 hours of significant sore throat which has progressed to signfificant congestion/sinus pressure. She has not taken any OTC medications for symptoms at this time as she has to run to store to get some. Discussed options with patient and will have her proceed to [address] for rapid COVID and rapid strep testing. I will treat her accordingly. I advised patient once results are finalized I will call with her to discus results and treatment plan. The patient indicates understanding of these issues and agrees to the plan. The patient is asked to return if sx's persist or worsen. Face to face time spent on Video Visit: 7  Total Time spent on visit including reviewing history, ordering labs/medication, patient examination and education: 15    Melissa Sullivan understands video visit evaluation is not a substitute for face-to-face examination or emergency care. Patient advised to go to ER or call 911 for worsening symptoms or acute distress.

## 2023-09-14 DIAGNOSIS — R63.2 BINGE EATING: Primary | ICD-10-CM

## 2023-12-04 ENCOUNTER — OFFICE VISIT (OUTPATIENT)
Dept: SURGERY | Facility: CLINIC | Age: 28
End: 2023-12-04
Payer: COMMERCIAL

## 2023-12-04 VITALS
SYSTOLIC BLOOD PRESSURE: 122 MMHG | HEIGHT: 64 IN | BODY MASS INDEX: 28.85 KG/M2 | HEART RATE: 100 BPM | WEIGHT: 169 LBS | OXYGEN SATURATION: 96 % | DIASTOLIC BLOOD PRESSURE: 80 MMHG

## 2023-12-04 DIAGNOSIS — Z86.39 HX OF OBESITY: ICD-10-CM

## 2023-12-04 DIAGNOSIS — Z83.3 FH: DIABETES MELLITUS: ICD-10-CM

## 2023-12-04 DIAGNOSIS — Z51.81 ENCOUNTER FOR THERAPEUTIC DRUG MONITORING: Primary | ICD-10-CM

## 2023-12-04 DIAGNOSIS — R53.83 FATIGUE, UNSPECIFIED TYPE: ICD-10-CM

## 2023-12-04 DIAGNOSIS — R63.2 BINGE EATING: ICD-10-CM

## 2023-12-04 DIAGNOSIS — E66.3 PATIENT OVERWEIGHT: ICD-10-CM

## 2023-12-04 DIAGNOSIS — R79.89 LOW VITAMIN B12 LEVEL: ICD-10-CM

## 2023-12-04 DIAGNOSIS — E55.9 VITAMIN D DEFICIENCY: ICD-10-CM

## 2023-12-04 PROCEDURE — 3079F DIAST BP 80-89 MM HG: CPT | Performed by: NURSE PRACTITIONER

## 2023-12-04 PROCEDURE — 3008F BODY MASS INDEX DOCD: CPT | Performed by: NURSE PRACTITIONER

## 2023-12-04 PROCEDURE — 99213 OFFICE O/P EST LOW 20 MIN: CPT | Performed by: NURSE PRACTITIONER

## 2023-12-04 PROCEDURE — 3074F SYST BP LT 130 MM HG: CPT | Performed by: NURSE PRACTITIONER

## 2023-12-04 RX ORDER — LISDEXAMFETAMINE DIMESYLATE CAPSULES 60 MG/1
60 CAPSULE ORAL DAILY
Qty: 30 CAPSULE | Refills: 0 | Status: SHIPPED | OUTPATIENT
Start: 2024-02-04 | End: 2024-03-05

## 2023-12-04 RX ORDER — LISDEXAMFETAMINE DIMESYLATE CAPSULES 60 MG/1
60 CAPSULE ORAL DAILY
Qty: 30 CAPSULE | Refills: 0 | Status: SHIPPED | OUTPATIENT
Start: 2024-01-04 | End: 2024-02-03

## 2023-12-04 RX ORDER — BUPROPION HYDROCHLORIDE 150 MG/1
150 TABLET ORAL DAILY
Qty: 90 TABLET | Refills: 1 | Status: SHIPPED | OUTPATIENT
Start: 2023-12-04

## 2023-12-04 RX ORDER — LISDEXAMFETAMINE DIMESYLATE CAPSULES 60 MG/1
60 CAPSULE ORAL DAILY
Qty: 30 CAPSULE | Refills: 0 | Status: SHIPPED | OUTPATIENT
Start: 2023-12-04 | End: 2024-01-03

## 2023-12-04 NOTE — PATIENT INSTRUCTIONS
Fast Like a Girl     BetterMe Mehnaz    Magnesium glycinate 200 to 500 mg/day for sleep. Start multi-vitamin. Life Extension. NOW. Garden of Life. Pure Encapsulations. Devorah Ibarra.  Jackelyn Cisneros

## 2024-04-03 ENCOUNTER — PATIENT MESSAGE (OUTPATIENT)
Dept: SURGERY | Facility: CLINIC | Age: 29
End: 2024-04-03

## 2024-04-03 DIAGNOSIS — R63.2 BINGE EATING: Primary | ICD-10-CM

## 2024-04-05 DIAGNOSIS — R63.2 BINGE EATING: Primary | ICD-10-CM

## 2024-04-05 RX ORDER — LISDEXAMFETAMINE DIMESYLATE CAPSULES 60 MG/1
60 CAPSULE ORAL DAILY
Qty: 30 CAPSULE | Refills: 0 | Status: SHIPPED | OUTPATIENT
Start: 2024-05-06 | End: 2024-06-05

## 2024-04-05 RX ORDER — LISDEXAMFETAMINE DIMESYLATE CAPSULES 60 MG/1
60 CAPSULE ORAL DAILY
Qty: 30 CAPSULE | Refills: 0 | Status: CANCELLED | OUTPATIENT
Start: 2024-04-05 | End: 2024-05-05

## 2024-04-05 RX ORDER — LISDEXAMFETAMINE DIMESYLATE CAPSULES 60 MG/1
60 CAPSULE ORAL DAILY
Qty: 30 CAPSULE | Refills: 0 | Status: CANCELLED | OUTPATIENT
Start: 2024-06-06 | End: 2024-07-06

## 2024-04-05 RX ORDER — LISDEXAMFETAMINE DIMESYLATE CAPSULES 60 MG/1
60 CAPSULE ORAL DAILY
Qty: 30 CAPSULE | Refills: 0 | Status: CANCELLED | OUTPATIENT
Start: 2024-05-06 | End: 2024-06-05

## 2024-04-05 RX ORDER — LISDEXAMFETAMINE DIMESYLATE CAPSULES 60 MG/1
60 CAPSULE ORAL DAILY
Qty: 30 CAPSULE | Refills: 0 | Status: SHIPPED | OUTPATIENT
Start: 2024-06-06 | End: 2024-07-06

## 2024-04-05 RX ORDER — LISDEXAMFETAMINE DIMESYLATE CAPSULES 60 MG/1
60 CAPSULE ORAL DAILY
Qty: 30 CAPSULE | Refills: 0 | Status: SHIPPED | OUTPATIENT
Start: 2024-04-05 | End: 2024-05-05

## 2024-08-02 DIAGNOSIS — R63.2 BINGE EATING: Primary | ICD-10-CM

## 2024-08-02 RX ORDER — LISDEXAMFETAMINE DIMESYLATE 60 MG/1
60 CAPSULE ORAL DAILY
Qty: 30 CAPSULE | Refills: 0 | Status: SHIPPED | OUTPATIENT
Start: 2024-10-03 | End: 2024-11-02

## 2024-08-02 RX ORDER — LISDEXAMFETAMINE DIMESYLATE 60 MG/1
60 CAPSULE ORAL DAILY
Qty: 30 CAPSULE | Refills: 0 | Status: SHIPPED | OUTPATIENT
Start: 2024-09-02 | End: 2024-10-02

## 2024-08-02 RX ORDER — LISDEXAMFETAMINE DIMESYLATE 60 MG/1
60 CAPSULE ORAL DAILY
Qty: 30 CAPSULE | Refills: 0 | Status: SHIPPED | OUTPATIENT
Start: 2024-08-02 | End: 2024-09-01

## 2024-09-12 ENCOUNTER — TELEMEDICINE (OUTPATIENT)
Dept: TELEHEALTH | Age: 29
End: 2024-09-12
Payer: COMMERCIAL

## 2024-09-12 ENCOUNTER — LAB ENCOUNTER (OUTPATIENT)
Dept: LAB | Age: 29
End: 2024-09-12
Attending: NURSE PRACTITIONER
Payer: COMMERCIAL

## 2024-09-12 DIAGNOSIS — J06.9 VIRAL URI: Primary | ICD-10-CM

## 2024-09-12 DIAGNOSIS — J02.9 SORE THROAT: ICD-10-CM

## 2024-09-12 LAB — BETA STREP GRP A SCREEN: NEGATIVE

## 2024-09-12 PROCEDURE — 87430 STREP A AG IA: CPT

## 2024-09-12 PROCEDURE — 99213 OFFICE O/P EST LOW 20 MIN: CPT | Performed by: NURSE PRACTITIONER

## 2024-09-12 NOTE — PROGRESS NOTES
Virtual/Telephone Check-In    Mariola Rayo verbally consents to a Virtual/Telephone Check-In service on 09/12/24.  Patient has been referred to the [facility] website at [email or web address] to review the yearly Consent to Treat document.  Patient understands and accepts financial responsibility for any deductible, co-insurance and/or co-pays associated with this service.       Telehealth Verbal Consent   I conducted a telehealth visit with Mariola Rayo today, 09/12/24, which was completed using two-way, real-time interactive audio and video communication. This has been done in good young to provide continuity of care in the best interest of the provider-patient relationship, due to the COVID - public health crisis/national emergency where restrictions of face-to-face office visits are ongoing. Every conscious effort was taken to allow for sufficient and adequate time to complete the visit.  The patient was made aware of the limitations of the telehealth visit, including treatment limitations as no physical exam could be performed.  The patient was advised to call 911 or to go to the ER in case there was an emergency.  The patient was also advised of the potential privacy & security concerns related to the telehealth platform.   The patient was made aware of where to find [facility]'s notice of privacy practices, telehealth consent form and other related consent forms and documents.  which are located on the [facility] website. The patient verbally agreed to telehealth consent form, related consents and the risks discussed.    Lastly, the patient confirmed that they were in [place].   Included in this visit, time may have been spent reviewing labs, medications, radiology tests and decision making. Appropriate medical decision-making and tests are ordered as detailed in the plan of care above.  Coding/billing information is submitted for this visit based on complexity of care and/or time spent for the visit.    CHIEF COMPLAINT:    No chief complaint on file.      HPI:   Mariola Rayo is a 29 year old female who presents for a video visit.  Patient reports sore throat.      Sx onset: yesterday  Treating sx with cough drops, ibuprofen  No known COVID or flu exposure.  Reports previous history of COVID  Home COVID test: 2 at home tests negative    Associated symptoms:    Yes   No  []    [x] Fever  [x]    [] Cough:mild  [x]    [] Congestion; mucus mostly clear  []    [x] Rhinorrhea     []    [x] Loss of Smell/Taste:    [x]    [] Sore throat - worse this AM, now improved some 6/10; pt reports tonsils normal size; slightly  red     []    [x] Ear Pain     []    [x] Fatigue    []    [x] Myalgias  [x]    [] Chills  - last night        []    [x] Headache    []    [x] Shortness of breath/Trouble Breathing  []    [x] Wheezing  []    [x] Chest pain/pressure    []    [x] GI symptoms                 []  Nausea;   [] Vomiting;   [] Diarrhea;   [] Upset stomach;    []Abdominal Pain           Current Outpatient Medications   Medication Sig Dispense Refill    Lisdexamfetamine Dimesylate (VYVANSE) 60 MG Oral Cap Take 1 capsule (60 mg total) by mouth daily. 30 capsule 0    [START ON 10/3/2024] Lisdexamfetamine Dimesylate (VYVANSE) 60 MG Oral Cap Take 1 capsule (60 mg total) by mouth daily. 30 capsule 0    buPROPion  MG Oral Tablet 24 Hr Take 1 tablet (150 mg total) by mouth daily. 90 tablet 1    spironolactone 50 MG Oral Tab Take 2 tablets (100 mg total) by mouth daily. 90 tablet 1      Past Medical History:    Obesity      Past Surgical History:   Procedure Laterality Date    Insert intrauterine device      Mirena 1/2017         Social History     Socioeconomic History    Marital status: Single   Occupational History    Occupation: Student     Comment: at [employer]. Also works at [employer]   Tobacco Use    Smoking status: Light Smoker    Smokeless tobacco: Never    Tobacco comments:     once a month   Substance and Sexual Activity    Alcohol use: Yes      Alcohol/week: 0.0 standard drinks of alcohol     Comment: 1 x week    Drug use: Yes     Frequency: 1.0 times per week     Types: Cannabis     Comment: once a week    Sexual activity: Yes     Partners: Male     Birth control/protection: Mirena     Comment: new partner         REVIEW OF SYSTEMS:   GENERAL: normal appetite  SKIN: no rashes or abnormal skin lesions  HEENT: See HPI  LUNGS:  See HPI  CARDIOVASCULAR: see HPI  GI: see HPI  NEURO: See HPI    EXAM:   General: Alert, Well-appearing, and In no acute distress  Respiratory:   Speaking in full sentences comfortably  Normal work of breathing  No cough during visit  Head: Normocephalic  Eyes: Conjunctiva clear  Nose: No obvious nasal discharge.  Skin: No obvious rashes or lesions from what observed.   Mood: Affect appropriate    ASSESSMENT AND PLAN:   Mariola Rayo is a 29 year old female who presents with symptoms that are consistent with    ASSESSMENT/PLAN:       Diagnoses and all orders for this visit:    Viral URI    Sore throat  -     Rapid Strep A Screen (Lc); Future        Discussed likely viral etiology.  Home COVID neg.  Advised to repeat if no improvement in 2 days  Pt requesting strep test; order placed for [facility] drive thru.  Rapid strep negative  Comfort measures discussed  To f/u with PCP if no improvement in 1 week or sooner for new or worsening symptoms  See pt instructions        Patient Instructions   Rapid strep test ordered for [place]  Restart flonase and claritin as planned  Take a decongestant such as sudafed (behind pharmacy counter)  Follow up with your PCP if no improvement in 1 week or sooner for new or worsening symptoms  See attached instructions     Verbalized understanding of instructions        Face to face time spent on Video Visit: 11 min  Total Time spent on visit including reviewing history, ordering labs/medication, patient examination and education: 15 min

## 2024-09-12 NOTE — PATIENT INSTRUCTIONS
Rapid strep test is negative  Restart flonase and claritin as planned  Take a decongestant such as sudafed (behind pharmacy counter)  Follow up with your PCP if no improvement in 1 week or sooner for new or worsening symptoms  See attached instructions

## 2024-09-26 ENCOUNTER — OFFICE VISIT (OUTPATIENT)
Dept: SURGERY | Facility: CLINIC | Age: 29
End: 2024-09-26
Payer: COMMERCIAL

## 2024-09-26 VITALS
HEART RATE: 126 BPM | HEIGHT: 64 IN | SYSTOLIC BLOOD PRESSURE: 112 MMHG | OXYGEN SATURATION: 96 % | DIASTOLIC BLOOD PRESSURE: 80 MMHG | WEIGHT: 179 LBS | BODY MASS INDEX: 30.56 KG/M2

## 2024-09-26 DIAGNOSIS — L68.0 HIRSUTISM: ICD-10-CM

## 2024-09-26 DIAGNOSIS — Z83.3 FH: DIABETES MELLITUS: ICD-10-CM

## 2024-09-26 DIAGNOSIS — E66.9 OBESITY (BMI 30-39.9): ICD-10-CM

## 2024-09-26 DIAGNOSIS — Z51.81 ENCOUNTER FOR THERAPEUTIC DRUG MONITORING: Primary | ICD-10-CM

## 2024-09-26 DIAGNOSIS — E55.9 VITAMIN D DEFICIENCY: ICD-10-CM

## 2024-09-26 DIAGNOSIS — R63.2 BINGE EATING: ICD-10-CM

## 2024-09-26 DIAGNOSIS — R79.89 LOW VITAMIN B12 LEVEL: ICD-10-CM

## 2024-09-26 PROCEDURE — 99213 OFFICE O/P EST LOW 20 MIN: CPT | Performed by: NURSE PRACTITIONER

## 2024-09-26 RX ORDER — LISDEXAMFETAMINE DIMESYLATE 70 MG/1
70 CAPSULE ORAL DAILY
Qty: 30 CAPSULE | Refills: 0 | Status: SHIPPED | OUTPATIENT
Start: 2024-09-26 | End: 2024-10-26

## 2024-09-26 RX ORDER — LISDEXAMFETAMINE DIMESYLATE 70 MG/1
70 CAPSULE ORAL EVERY MORNING
Qty: 30 CAPSULE | Refills: 0 | Status: SHIPPED | OUTPATIENT
Start: 2024-11-27 | End: 2024-12-27

## 2024-09-26 RX ORDER — LISDEXAMFETAMINE DIMESYLATE 70 MG/1
70 CAPSULE ORAL DAILY
Qty: 30 CAPSULE | Refills: 0 | Status: SHIPPED | OUTPATIENT
Start: 2024-10-27 | End: 2024-11-26

## 2024-09-26 RX ORDER — BUPROPION HYDROCHLORIDE 75 MG/1
75 TABLET ORAL 2 TIMES DAILY
Qty: 60 TABLET | Refills: 3 | Status: SHIPPED | OUTPATIENT
Start: 2024-09-26

## 2024-09-26 RX ORDER — SPIRONOLACTONE 50 MG/1
100 TABLET, FILM COATED ORAL DAILY
Qty: 90 EACH | Refills: 1 | Status: SHIPPED | OUTPATIENT
Start: 2024-09-26

## 2024-09-26 NOTE — PROGRESS NOTES
[facility], [place], REANNA  [address], [facility] [address]  [facility] [address]  Dept: [phone]       Patient:  Mariola Rayo  :      1995  MRN:      [id number]    Chief Complaint:    Chief Complaint   Patient presents with    Follow - Up    Weight Management    Obesity       SUBJECTIVE     History of Present Illness:  Mariola is being seen today for a follow-up for non surgical weight loss.     HW since last visit 182 lbs.   Off Vyvanse February through summer 2024.  Did not fill wellbutrin due to cost.   Recently resumed Vyvanse.       Initial HPI: 19:  23 yo female presents to clinic for non surgical weight loss.      Reports birth weight was in normal range.      Reports she is a \"stress eater.\"      IUD intact, placed 2-3 years ago.     Patient is considering medications and is not a candidate for bariatric surgery for weight loss.     Patient has history of eating disorder(s). \"Binge eating.\"     Patient is employed: [employer]; in school phlebotomy.  Patient lives with parents, brother, sister. One more older brother does not live in the home.      Patient's goal weight: 155 lbs, then reevaluate   Biggest weight loss in the past: 20 lb  How weight loss was achieved: ketogenic diet  Heaviest weight ever: 203 lbs  Previous use of medical weight loss medications: None     Physical activity: Walking/running     Sleep: 6-8 hours/night, occasionally interrupted     Sleep screening: Negative     Barriers: [place]      Values: Disease prevention, self, self-worth          Past Medical History:   Past Medical History:    Obesity        Comorbidities:      OBJECTIVE     Vitals: /80 (BP Location: Left arm, Patient Position: Sitting, Cuff Size: adult)   Pulse (!) 126   Ht 5' 4\" (1.626 m)   Wt 179 lb (81.2 kg)   SpO2 96%   BMI 30.73 kg/m²     Initial weight loss: +10   Total weight loss: -06   Start weight: 185    Wt Readings from Last 6  Encounters:   09/26/24 179 lb (81.2 kg)   12/04/23 169 lb (76.7 kg)   05/11/23 168 lb (76.2 kg)   11/10/22 156 lb (70.8 kg)   10/01/22 148 lb (67.1 kg)   07/13/22 149 lb 6.4 oz (67.8 kg)       Patient Medications:    Current Outpatient Medications   Medication Sig Dispense Refill    buPROPion 75 MG Oral Tab Take 1 tablet (75 mg total) by mouth 2 (two) times daily. 60 tablet 3    Lisdexamfetamine Dimesylate (VYVANSE) 70 MG Oral Cap Take 1 capsule (70 mg total) by mouth daily. 30 capsule 0    [START ON 10/27/2024] Lisdexamfetamine Dimesylate (VYVANSE) 70 MG Oral Cap Take 1 capsule (70 mg total) by mouth daily. 30 capsule 0    [START ON 11/27/2024] Lisdexamfetamine Dimesylate (VYVANSE) 70 MG Oral Cap Take 1 capsule (70 mg total) by mouth every morning. 30 capsule 0    spironolactone 50 MG Oral Tab Take 2 tablets (100 mg total) by mouth daily. 90 each 1    Lisdexamfetamine Dimesylate (VYVANSE) 60 MG Oral Cap Take 1 capsule (60 mg total) by mouth daily. 30 capsule 0    [START ON 10/3/2024] Lisdexamfetamine Dimesylate (VYVANSE) 60 MG Oral Cap Take 1 capsule (60 mg total) by mouth daily. 30 capsule 0     Allergies:  Patient has no known allergies.     Social History:  Reviewed    Surgical History:    Past Surgical History:   Procedure Laterality Date    Insert intrauterine device      Mirena 1/2017     Family History:    Family History   Problem Relation Age of Onset    Diabetes Mother     Heart Disorder Mother     Cancer Other         lung cancer-grandfather     Initial intake:  After dinner behavior: Chips, more dinner foods  Night eating: -  Portion sizes: +  Binge: BED 7+  Emotional: +  Depression: Total PHQ Score: 16, no SI  Grazing: +  Sweet tooth: -  Crunchy/salty: +  Etoh: Once a week- 4-5 drinks   [place] tea- big can 2-3 per week   Soda Drinker: Yes                 If yes, how much?:  Diet pop- 1 can/day  Sports Drinks:  Yes               If yes, how much?:  Powerade, one every 3 weeks   Juice:  No                         Food Journal  Reviewed and Discussed:       Patient has a Food Journal?: yes not always consistent     Patient is reading nutrition labels?  yes  Average Caloric Intake:     Average CHO Intake:   Is patient exercising? no   Type of exercise?      Eating Habits  Patient states the following:  Eats 2-3 meal(s) per day  # of snacks per day: 1   Type of snacks:    Amount of soda consumption per day:  Occasional   Amount of water (in ounces) per day:  Aims for 64 oz/day  Toughest challenge:   evening binge eating, weekends   Sleeping: ok    B: skips  L: may skip  S: yogurt, cottage cheese, fruit   D: protein, vegetable    Nutritional Goals  Eat 3-4 cups of fresh fruits or vegetables daily    Behavior Modifications Reviewed and Discussed  Eat breakfast, Eat 3 meals per day, Plan meals in advance, Read nutrition labels, Drink 64 oz of water per day, Maintain a daily food journal, Utlize portion control strategies to reduce calorie intake, Identify triggers for eating and manage cues and Eat slowly and take 20 to 30 minutes to complete each meal    Exercise Goals Reviewed and Discussed    Aim for 150 minutes moderate level exercise weekly with 2-3 days strength training as tolerated    ROS:    Constitutional: positive for fatigue and improved   Respiratory: negative  Cardiovascular: negative  Gastrointestinal: negative  Genitourinary:negative, IUD intact   Integument/breast: positive for mild facial hair growth   Hematologic/lymphatic: negative  Musculoskeletal:negative  Neurological: negative  Behavioral/Psych: positive for depression and situational   Endocrine: negative  All other systems were reviewed and are negative    Physical Exam:  General: alert, oriented x 3, cooperative, speaking in full sentences, appears stated age and cooperative  Head: Normocephalic, without obvious abnormality, atraumatic  Neck: symmetrical, trachea midline   Lungs: No increased work of breathing   Extremities: extremities  normal  Skin: Upper body skin color and texture appear intact     ASSESSMENT     Encounter Diagnosis(ses):   Encounter Diagnoses   Name Primary?    Encounter for therapeutic drug monitoring Yes    Binge eating     Obesity (BMI 30-39.9)     FH: diabetes mellitus     Low vitamin B12 level     Vitamin D deficiency     Hirsutism          PLAN       Patient is not a candidate for bariatric surgery. Patient desires to pursue medical weight loss at this time.    Diagnoses and all orders for this visit:    Encounter for therapeutic drug monitoring  -     Comp Metabolic Panel (14); Future  -     Lipid Panel; Future  -     EKG 12 Lead; Future    Binge eating  -     Comp Metabolic Panel (14); Future  -     Lipid Panel; Future  -     EKG 12 Lead; Future  -     Lisdexamfetamine Dimesylate (VYVANSE) 70 MG Oral Cap; Take 1 capsule (70 mg total) by mouth daily.  -     Lisdexamfetamine Dimesylate (VYVANSE) 70 MG Oral Cap; Take 1 capsule (70 mg total) by mouth daily.  -     Lisdexamfetamine Dimesylate (VYVANSE) 70 MG Oral Cap; Take 1 capsule (70 mg total) by mouth every morning.  -     spironolactone 50 MG Oral Tab; Take 2 tablets (100 mg total) by mouth daily.    Obesity (BMI 30-39.9)  -     Comp Metabolic Panel (14); Future  -     Lipid Panel; Future  -     EKG 12 Lead; Future  -     Lisdexamfetamine Dimesylate (VYVANSE) 70 MG Oral Cap; Take 1 capsule (70 mg total) by mouth daily.  -     Lisdexamfetamine Dimesylate (VYVANSE) 70 MG Oral Cap; Take 1 capsule (70 mg total) by mouth daily.  -     Lisdexamfetamine Dimesylate (VYVANSE) 70 MG Oral Cap; Take 1 capsule (70 mg total) by mouth every morning.  -     spironolactone 50 MG Oral Tab; Take 2 tablets (100 mg total) by mouth daily.    FH: diabetes mellitus  -     Comp Metabolic Panel (14); Future  -     Lipid Panel; Future  -     EKG 12 Lead; Future    Low vitamin B12 level  -     Comp Metabolic Panel (14); Future  -     Lipid Panel; Future  -     EKG 12 Lead; Future    Vitamin D  deficiency  -     Comp Metabolic Panel (14); Future  -     Lipid Panel; Future  -     EKG 12 Lead; Future    Hirsutism  -     Comp Metabolic Panel (14); Future  -     Lipid Panel; Future  -     EKG 12 Lead; Future  -     spironolactone 50 MG Oral Tab; Take 2 tablets (100 mg total) by mouth daily.    Other orders  -     buPROPion 75 MG Oral Tab; Take 1 tablet (75 mg total) by mouth 2 (two) times daily.        Hx OBESITY/WEIGHT GAIN:     Discussed starting weight: 185 lbs; BMI: 31.81; WC: 38 in.   Patient's goal weight: 155 lbs, then reevaluate   Values: Disease prevention, self, self-worth     Recommended patient continue intensive lifestyle and behavioral modifications at this time for weight loss.     Reviewed lifestyle modifications: Whole Food/Plant Strong/Low Glycemic Index diet, moderate alcohol consumption, reduced sodium intake to no more than 2,400 mg/day, and at least 150 minutes of moderate physical activity per week.   Avoid processed, poor quality carbohydrates, refined grains, flour, sugar.     Goals for next month:  1. Keep a food log.   2. Drink 64 ounces of non-caloric beverages per day. No fruit juices or regular soda.  3. Aim for 150 minutes moderate exercise per week.    4. Increase fruit and vegetable servings to 5-6 per day.    5. Improve sleep and stress.   6. Practice self love/compassion.     Labs updated by pcp: 1/23/2020.  CRP elevated and insulin in range.  Vitamin D low, supplementing 2000 IU/day.  No other significant abnormalities.   Update labs at this time. Not previously affordable.     Reviewed medication options for weight loss in detail with patient.      Denies hx cardiac disease or substance abuse.  +Binge Eating.  +Night eating.     Continue Vyvanse- increase to 70 mg by mouth in the AM.     Restart Wellbutrin 75 mg BID. 150 mg dose previously not affordable.     Restart spironolactone 50 mg in the AM- acne.     Consider Metformin.     Has taken diethylpropion 75 mg daily, as  well.      Retrialed topiramate x 3 weeks. Avoid medication, no perceived benefit per patient.     Discussed risks, benefits, rationale, and side effects of medication(s) including hypertension, palpitations, tachycardia, dizziness, constipation, dry mouth, and anxiety among others. Must avoid pregnancy during use, counseled on adequate contraception during use. Patient states understanding of all information and instructions. IUD intact.     Needs EKG.    Aim for adequate fiber intake -35-45 g/day.     Continue IF.     Restart MVI.    Recommend magnesium glycinate 500 mg daily. NOW, Pure encapsulations  Include ES baths: 1 cup ES, 1/2 cup baking soda, EOs.    3, 5, 7, 10 Day Resets.    Goal weight: 140-150 lbs.    Increase exercising.     Dr. Tate next visit as needed.    RTC 4 months.      MATTHIEU Gomez

## 2024-11-04 ENCOUNTER — IMMUNIZATION (OUTPATIENT)
Dept: LAB | Age: 29
End: 2024-11-04
Attending: EMERGENCY MEDICINE
Payer: COMMERCIAL

## 2024-11-04 DIAGNOSIS — Z23 NEED FOR VACCINATION: Primary | ICD-10-CM

## 2024-11-04 PROCEDURE — 90656 IIV3 VACC NO PRSV 0.5 ML IM: CPT

## 2024-11-04 PROCEDURE — 90471 IMMUNIZATION ADMIN: CPT

## 2024-11-04 PROCEDURE — 90480 ADMN SARSCOV2 VAC 1/ONLY CMP: CPT

## 2025-01-16 DIAGNOSIS — E66.9 OBESITY (BMI 30-39.9): ICD-10-CM

## 2025-01-16 DIAGNOSIS — R63.2 BINGE EATING: ICD-10-CM

## 2025-01-16 RX ORDER — LISDEXAMFETAMINE DIMESYLATE 70 MG/1
70 CAPSULE ORAL EVERY MORNING
Qty: 30 CAPSULE | Refills: 0 | Status: SHIPPED | OUTPATIENT
Start: 2025-01-16 | End: 2025-02-15

## 2025-03-03 DIAGNOSIS — R63.2 BINGE EATING: ICD-10-CM

## 2025-03-03 RX ORDER — LISDEXAMFETAMINE DIMESYLATE 70 MG/1
70 CAPSULE ORAL EVERY MORNING
Qty: 30 CAPSULE | Refills: 0 | Status: SHIPPED | OUTPATIENT
Start: 2025-03-03 | End: 2025-04-02

## 2025-03-27 ENCOUNTER — OFFICE VISIT (OUTPATIENT)
Dept: SURGERY | Facility: CLINIC | Age: 30
End: 2025-03-27
Payer: COMMERCIAL

## 2025-03-27 VITALS
HEART RATE: 91 BPM | WEIGHT: 181.63 LBS | SYSTOLIC BLOOD PRESSURE: 122 MMHG | HEIGHT: 64 IN | DIASTOLIC BLOOD PRESSURE: 88 MMHG | BODY MASS INDEX: 31.01 KG/M2 | OXYGEN SATURATION: 99 %

## 2025-03-27 DIAGNOSIS — R63.2 BINGE EATING: ICD-10-CM

## 2025-03-27 DIAGNOSIS — E55.9 VITAMIN D DEFICIENCY: ICD-10-CM

## 2025-03-27 DIAGNOSIS — Z51.81 ENCOUNTER FOR THERAPEUTIC DRUG MONITORING: Primary | ICD-10-CM

## 2025-03-27 DIAGNOSIS — R53.83 FATIGUE, UNSPECIFIED TYPE: ICD-10-CM

## 2025-03-27 DIAGNOSIS — Z83.3 FH: DIABETES MELLITUS: ICD-10-CM

## 2025-03-27 DIAGNOSIS — R79.89 LOW VITAMIN B12 LEVEL: ICD-10-CM

## 2025-03-27 DIAGNOSIS — E66.9 OBESITY (BMI 30-39.9): ICD-10-CM

## 2025-03-27 DIAGNOSIS — L68.0 HIRSUTISM: ICD-10-CM

## 2025-03-27 PROCEDURE — 3079F DIAST BP 80-89 MM HG: CPT | Performed by: NURSE PRACTITIONER

## 2025-03-27 PROCEDURE — 99213 OFFICE O/P EST LOW 20 MIN: CPT | Performed by: NURSE PRACTITIONER

## 2025-03-27 PROCEDURE — 3008F BODY MASS INDEX DOCD: CPT | Performed by: NURSE PRACTITIONER

## 2025-03-27 PROCEDURE — 3074F SYST BP LT 130 MM HG: CPT | Performed by: NURSE PRACTITIONER

## 2025-03-27 RX ORDER — TIRZEPATIDE 2.5 MG/.5ML
2.5 INJECTION, SOLUTION SUBCUTANEOUS WEEKLY
Qty: 2 ML | Refills: 1 | Status: SHIPPED | OUTPATIENT
Start: 2025-03-27

## 2025-03-27 RX ORDER — BUPROPION HYDROCHLORIDE 150 MG/1
150 TABLET ORAL DAILY
Qty: 90 TABLET | Refills: 1 | Status: SHIPPED | OUTPATIENT
Start: 2025-03-27

## 2025-03-27 NOTE — PATIENT INSTRUCTIONS
Zepbound schedule:    2.5 mg once a week for 4 consecutive weeks, then may increase  5 mg once a week for 4 consecutive weeks, then may increase  7.5 mg once a week for 4 consecutive weeks, then may increase  10 mg once a week for 4 consecutive weeks, then may increase  12.5 mg once a week for 4 consecutive weeks, then may increase  15 mg once a week- final dose     Each month, please message me to let me know how you are doing. I can either refill the medication to a higher dose as stated above, or you will have refills at the current/same dose until you work through the side effects which are typically nausea, vomiting, GI upset, heart burn, constipation, loose stool, and/or fatigue.

## 2025-03-27 NOTE — PROGRESS NOTES
[facility], [place] MARCELA, REANNA  [address], [facility] [address]  [facility] [address]  Dept: [phone]       Patient:  Mariola Rayo  :      1995  MRN:      [id number]    Chief Complaint:    Chief Complaint   Patient presents with    Follow - Up    Weight Management    Obesity       SUBJECTIVE     History of Present Illness:  Mariola is being seen today for a follow-up for non surgical weight loss.      lbs since last visit with Vyvanse.  Ongoing shortages, has not filled medication for 1.5 months.    Continues on daily wellbutrin due to cost.     Initial HPI: 19:  23 yo female presents to clinic for non surgical weight loss.      Reports birth weight was in normal range.      Reports she is a \"stress eater.\"      IUD intact, placed 2-3 years ago.     Patient is considering medications and is not a candidate for bariatric surgery for weight loss.     Patient has history of eating disorder(s). \"Binge eating.\"     Patient is employed: [email or web address]; in school phlebotomy.  Patient lives with parents, brother, sister. One more older brother does not live in the home.      Patient's goal weight: 155 lbs, then reevaluate   Biggest weight loss in the past: 20 lb  How weight loss was achieved: ketogenic diet  Heaviest weight ever: 203 lbs  Previous use of medical weight loss medications: None     Physical activity: Walking/running     Sleep: 6-8 hours/night, occasionally interrupted     Sleep screening: Negative     Barriers: [place]      Values: Disease prevention, self, self-worth          Past Medical History:   Past Medical History:    Obesity        Comorbidities:      OBJECTIVE     Vitals: /88 (BP Location: Right arm, Patient Position: Sitting, Cuff Size: adult)   Pulse 91   Ht 5' 4\" (1.626 m)   Wt 181 lb 9.6 oz (82.4 kg)   SpO2 99%   BMI 31.17 kg/m²     Initial weight loss: +02   Total weight loss: -03   Start weight: 185    Wt Readings from Last  6 Encounters:   03/27/25 181 lb 9.6 oz (82.4 kg)   09/26/24 179 lb (81.2 kg)   12/04/23 169 lb (76.7 kg)   05/11/23 168 lb (76.2 kg)   11/10/22 156 lb (70.8 kg)   10/01/22 148 lb (67.1 kg)       Patient Medications:    Current Outpatient Medications   Medication Sig Dispense Refill    buPROPion  MG Oral Tablet 24 Hr Take 1 tablet (150 mg total) by mouth daily. 90 tablet 1    Tirzepatide-Weight Management (ZEPBOUND) 2.5 MG/0.5ML Subcutaneous Solution Auto-injector Inject 2.5 mg into the skin once a week. 2 mL 1    Lisdexamfetamine Dimesylate (VYVANSE) 70 MG Oral Cap Take 1 capsule (70 mg total) by mouth every morning. 30 capsule 0    spironolactone 50 MG Oral Tab Take 2 tablets (100 mg total) by mouth daily. 90 each 1     Allergies:  Patient has no known allergies.     Social History:  Reviewed    Surgical History:    Past Surgical History:   Procedure Laterality Date    Insert intrauterine device      Mirena 1/2017     Family History:    Family History   Problem Relation Age of Onset    Diabetes Mother     Heart Disorder Mother     Cancer Other         lung cancer-grandfather     Initial intake:  After dinner behavior: Chips, more dinner foods  Night eating: -  Portion sizes: +  Binge: BED 7+  Emotional: +  Depression: Total PHQ Score: 16, no SI  Grazing: +  Sweet tooth: -  Crunchy/salty: +  Etoh: Once a week- 4-5 drinks   [place] tea- big can 2-3 per week   Soda Drinker: Yes                 If yes, how much?:  Diet pop- 1 can/day  Sports Drinks:  Yes               If yes, how much?:  Powerade, one every 3 weeks   Juice:  No                        Food Journal  Reviewed and Discussed:       Patient has a Food Journal?: yes not always consistent     Patient is reading nutrition labels?  yes  Average Caloric Intake:     Average CHO Intake:   Is patient exercising? no   Type of exercise?  When on Vyvanse has motivation to exercise     Eating Habits  Patient states the following:  Eats 2-3 meal(s) per day  # of  snacks per day: 1   Type of snacks:    Amount of soda consumption per day:  Occasional   Amount of water (in ounces) per day:  Aims for 64 oz/day  Toughest challenge:   evening binge eating, weekends   Sleeping: ok    Nutritional Goals  Eat 3-4 cups of fresh fruits or vegetables daily    Behavior Modifications Reviewed and Discussed  Eat breakfast, Eat 3 meals per day, Plan meals in advance, Read nutrition labels, Drink 64 oz of water per day, Maintain a daily food journal, Utlize portion control strategies to reduce calorie intake, Identify triggers for eating and manage cues and Eat slowly and take 20 to 30 minutes to complete each meal    Exercise Goals Reviewed and Discussed    Aim for 150 minutes moderate level exercise weekly with 2-3 days strength training as tolerated    ROS:    Constitutional: positive for fatigue and improved   Respiratory: negative  Cardiovascular: negative  Gastrointestinal: negative  Genitourinary:negative, IUD intact   Integument/breast: positive for mild facial hair growth   Hematologic/lymphatic: negative  Musculoskeletal:negative  Neurological: negative  Behavioral/Psych: positive for depression and situational   Endocrine: negative  All other systems were reviewed and are negative    Physical Exam:  General appearance: alert, appears stated age, cooperative and obese  Head: Normocephalic, without obvious abnormality, atraumatic  Neck: no adenopathy, no carotid bruit, no JVD, supple, symmetrical, trachea midline and thyroid not enlarged, symmetric, no tenderness/mass/nodules  Lungs: clear to auscultation bilaterally  Heart: S1, S2 normal, no murmur, click, rub or gallop, regular rate and rhythm  Abdomen: soft, non-tender; bowel sounds normal; no masses,  no organomegaly and abdomen obese   Extremities: intact, no edema   Pulses: 2+ and symmetric  Skin: intact   Neurologic: Grossly normal      ASSESSMENT     Encounter Diagnosis(ses):   Encounter Diagnoses   Name Primary?    Encounter  for therapeutic drug monitoring Yes    Binge eating     Obesity (BMI 30-39.9)     Low vitamin B12 level     Vitamin D deficiency     FH: diabetes mellitus     Hirsutism     Fatigue, unspecified type          PLAN       Patient is not a candidate for bariatric surgery. Patient desires to pursue medical weight loss at this time.    Diagnoses and all orders for this visit:    Encounter for therapeutic drug monitoring  -     EKG 12 Lead; Future    Binge eating  -     buPROPion  MG Oral Tablet 24 Hr; Take 1 tablet (150 mg total) by mouth daily.  -     EKG 12 Lead; Future    Obesity (BMI 30-39.9)  -     EKG 12 Lead; Future  -     Tirzepatide-Weight Management (ZEPBOUND) 2.5 MG/0.5ML Subcutaneous Solution Auto-injector; Inject 2.5 mg into the skin once a week.    Low vitamin B12 level  -     EKG 12 Lead; Future    Vitamin D deficiency  -     EKG 12 Lead; Future    FH: diabetes mellitus  -     EKG 12 Lead; Future    Hirsutism  -     EKG 12 Lead; Future    Fatigue, unspecified type  -     EKG 12 Lead; Future        Hx OBESITY/WEIGHT GAIN:     Discussed starting weight: 185 lbs; BMI: 31.81; WC: 38 in.   Patient's goal weight: 155 lbs, then reevaluate   Values: Disease prevention, self, self-worth     Recommended patient continue intensive lifestyle and behavioral modifications at this time for weight loss.     Reviewed lifestyle modifications: Whole Food/Plant Strong/Low Glycemic Index diet, moderate alcohol consumption, reduced sodium intake to no more than 2,400 mg/day, and at least 150 minutes of moderate physical activity per week.   Avoid processed, poor quality carbohydrates, refined grains, flour, sugar.     Goals for next month:  1. Keep a food log.   2. Drink 64 ounces of non-caloric beverages per day. No fruit juices or regular soda.  3. Aim for 150 minutes moderate exercise per week.    4. Increase fruit and vegetable servings to 5-6 per day.    5. Improve sleep and stress.   6. Practice self  love/compassion.     Labs updated by pcp:   1/23/2020.  CRP elevated and insulin in range.  Vitamin D low, supplementing 2000 IU/day.  No other significant abnormalities.   Update labs at this time. Not previously affordable.     Reviewed medication options for weight loss in detail with patient.     Denies personal or family hx medullary thyroid CA, endocrine neoplasia syndrome, pancreatitis hx, suicidal ideation. No renal impairment, severe GI disease, diabetes, pancreatitis risks noted.  Denies hx cardiac disease or substance abuse.  +Binge Eating.  +Night eating.     Cannot fill Vyvanse 70 mg dose- ongoing shortages.  Consider low dose phentermine if no Zepbound or Wegovy coverage.     Continue Wellbutrin- new insurance- switch back to 150 mg ER dose daily.    Restart spironolactone 50 mg in the AM- acne.     Consider Metformin.     Has taken diethylpropion 75 mg daily, as well. Did not work well per patient's understanding.      Retrialed topiramate x 3 weeks. Avoid medication, no perceived benefit per patient.     Will check Zepbound and Wegovy coverage.  If covered start 2.5 mg weekly Zepbound.    SQ administration teaching provided to patient.   Discussed risks, benefits, and side effects of medication. Avoid pregnancy during use. Contraindications for medication discussed at length. Patient states understanding. IUD intact.     Needs EKG for long-term stimulant use.    Aim for adequate fiber intake -30 g/day.  Aim for 65 grams protein/day.      Continue IF.     Recommend daily MVI.  Recommend magnesium glycinate 500 mg daily. ES baths: 1 cup ES, 1/2 cup baking soda, EOs.    3, 5, 7, 10 Day Resets.    Goal weight: 140-150 lbs.    Increase exercising.     Dr. Tate as needed.     RTC 4 months.      MATTHIEU Gomez

## 2025-03-31 DIAGNOSIS — R63.2 BINGE EATING: ICD-10-CM

## 2025-03-31 DIAGNOSIS — E66.9 OBESITY (BMI 30-39.9): Primary | ICD-10-CM

## 2025-03-31 RX ORDER — PHENTERMINE HYDROCHLORIDE 15 MG/1
15 CAPSULE ORAL EVERY MORNING
Qty: 30 CAPSULE | Refills: 3 | Status: SHIPPED | OUTPATIENT
Start: 2025-03-31

## 2025-04-02 DIAGNOSIS — E66.9 OBESITY (BMI 30-39.9): Primary | ICD-10-CM

## 2025-04-02 RX ORDER — SEMAGLUTIDE 0.25 MG/.5ML
0.25 INJECTION, SOLUTION SUBCUTANEOUS WEEKLY
Qty: 4 EACH | Refills: 1 | Status: SHIPPED | OUTPATIENT
Start: 2025-04-02

## 2025-04-10 DIAGNOSIS — E66.9 OBESITY (BMI 30-39.9): Primary | ICD-10-CM

## 2025-04-10 RX ORDER — PHENTERMINE HYDROCHLORIDE 37.5 MG/1
37.5 TABLET ORAL
Qty: 30 TABLET | Refills: 3 | Status: SHIPPED | OUTPATIENT
Start: 2025-04-10

## 2025-08-14 DIAGNOSIS — E66.9 OBESITY (BMI 30-39.9): ICD-10-CM

## 2025-08-14 DIAGNOSIS — R63.2 BINGE EATING: ICD-10-CM

## 2025-08-14 DIAGNOSIS — L68.0 HIRSUTISM: ICD-10-CM

## 2025-08-14 RX ORDER — SPIRONOLACTONE 50 MG/1
100 TABLET, FILM COATED ORAL DAILY
Qty: 90 TABLET | Refills: 1 | Status: SHIPPED | OUTPATIENT
Start: 2025-08-14

## 2025-08-18 DIAGNOSIS — R63.2 BINGE EATING: Primary | ICD-10-CM

## 2025-08-18 RX ORDER — LISDEXAMFETAMINE DIMESYLATE 60 MG/1
60 CAPSULE ORAL DAILY
Qty: 30 CAPSULE | Refills: 0 | Status: SHIPPED | OUTPATIENT
Start: 2025-10-19 | End: 2025-11-18

## 2025-08-18 RX ORDER — LISDEXAMFETAMINE DIMESYLATE 60 MG/1
60 CAPSULE ORAL DAILY
Qty: 30 CAPSULE | Refills: 0 | Status: SHIPPED | OUTPATIENT
Start: 2025-09-18 | End: 2025-10-18

## 2025-08-18 RX ORDER — LISDEXAMFETAMINE DIMESYLATE 60 MG/1
60 CAPSULE ORAL DAILY
Qty: 30 CAPSULE | Refills: 0 | Status: SHIPPED | OUTPATIENT
Start: 2025-08-18 | End: 2025-09-17

## (undated) NOTE — MR AVS SNAPSHOT
Marie  [address]  [phone]               Thank you for choosing us for your health care visit with Kati Chen MD.  We are glad to serve you and happy to provide you with this summa You can access your MyChart to more actively manage your health care and view more details from this visit by going to [email or web address].   If you've recently had a stay at the Hospital you can access your discharge instructions in [address] by rob

## (undated) NOTE — MR AVS SNAPSHOT
[facility] SPECIALTY [facility] - [place] [address] [address]  [id number] [phone]               Thank you for choosing us for your health care visit with DOMINIC Hagan.   We are glad to serve you and happy to provide you with this summary of Call [phone] for help. [employer] is NOT to be used for urgent needs. For medical emergencies, dial 911.            Visit [facility] online at  [email or web address]

## (undated) NOTE — LETTER
Date & Time: 5/8/2018, 8:00 PM  Patient: Toy Chacon  Encounter Provider(s):    KALANI Abreu       To Whom It May Concern:    Toy Chacon was seen and treated in our department on 5/8/2018. Please excuse her from the next 2 days of work.   If yo

## (undated) NOTE — MR AVS SNAPSHOT
Marie  [address]  [phone]               Thank you for choosing us for your health care visit with Kelley Zepeda MD.  We are glad to serve you and happy to provide you with this summa

## (undated) NOTE — LETTER
Date: 1/3/2018    Patient Name: Christian Gain          To Whom it may concern: This letter has been written at the patient's request. The above patient was seen at the [facility] for treatment of a medical condition.     This patient should

## (undated) NOTE — MR AVS SNAPSHOT
[facility] SPECIALTY [facility] - [place] [address] [address] [address] [phone]               Thank you for choosing us for your health care visit with Emy Urias MD.  We are glad to serve you and happy to provide you with this summary of y Take 2 tablets night prior to procedure   Commonly known as:  CYTOTEC                Where to Get Your Medications      These medications were sent to [facility], [address] [place] [address] 24E [phone], [facility], [address] [place], [place] [id number] You can access your MyChart to more actively manage your health care and view more details from this visit by going to [email or web address].   If you've recently had a stay at the Hospital you can access your discharge instructions in [address] by rob

## (undated) NOTE — LETTER
1/11/2022          To Whom It May Concern:    Dean Clayton is currently under my medical care  She reports she tested positive for covid 1/3/2022. She has completed quarantine and is now symptom free. She may return to school on 1/12/2022.   Activity is re

## (undated) NOTE — Clinical Note
Fam,I saw Clive Arguelles in clinic today for weight loss/management. I have recommended intensive lifestyle/behavioral modifications for weight loss.  In addition, I have recommended wellbutrin to target stress/emotional eating, meeting with nutritionist, and migue

## (undated) NOTE — Clinical Note
AUTHORIZATION FOR SURGICAL OPERATION OR OTHER PROCEDURE    1. I hereby authorize Dr. Francie Messer, and [facility] staff assigned to my case to perform the following operation and/or procedure at the [facility]:    IUD Insertion    2.   My ph Relationship to Patient:             Parent    Responsible person                            Spouse  In case of minor or                     Other  _____________   Incompetent name:  __________________________________________________

## (undated) NOTE — LETTER
[facility], [address]  [place], [place]       01/25/20        Patient: Geneav Silverman   YOB: 1995   Date of Visit: 1/25/2020       Dear  Dr. Shyam Huffman MD,      Thank you for referring Geneva Silverman to my practice.   Please find

## (undated) NOTE — LETTER
Date: 1/5/2018    Patient Name: Roldan Orosco      To Whom it may concern: This letter has been written at the patient's request. The above patient was seen at the [facility] for treatment of a medical condition on 1/3/18.     This patient s